# Patient Record
Sex: MALE | Race: WHITE | Employment: FULL TIME | URBAN - METROPOLITAN AREA
[De-identification: names, ages, dates, MRNs, and addresses within clinical notes are randomized per-mention and may not be internally consistent; named-entity substitution may affect disease eponyms.]

---

## 2023-05-22 ENCOUNTER — OFFICE VISIT (OUTPATIENT)
Dept: ORTHOPEDIC SURGERY | Age: 58
End: 2023-05-22
Payer: COMMERCIAL

## 2023-05-22 VITALS
WEIGHT: 265 LBS | OXYGEN SATURATION: 96 % | DIASTOLIC BLOOD PRESSURE: 114 MMHG | HEIGHT: 71 IN | BODY MASS INDEX: 37.1 KG/M2 | SYSTOLIC BLOOD PRESSURE: 169 MMHG | HEART RATE: 63 BPM

## 2023-05-22 DIAGNOSIS — S83.242A TEAR OF MEDIAL MENISCUS OF LEFT KNEE, UNSPECIFIED TEAR TYPE, UNSPECIFIED WHETHER OLD OR CURRENT TEAR, INITIAL ENCOUNTER: Primary | ICD-10-CM

## 2023-05-22 PROCEDURE — G8428 CUR MEDS NOT DOCUMENT: HCPCS | Performed by: ORTHOPAEDIC SURGERY

## 2023-05-22 PROCEDURE — 4004F PT TOBACCO SCREEN RCVD TLK: CPT | Performed by: ORTHOPAEDIC SURGERY

## 2023-05-22 PROCEDURE — 99203 OFFICE O/P NEW LOW 30 MIN: CPT | Performed by: ORTHOPAEDIC SURGERY

## 2023-05-22 PROCEDURE — 3017F COLORECTAL CA SCREEN DOC REV: CPT | Performed by: ORTHOPAEDIC SURGERY

## 2023-05-22 PROCEDURE — G8417 CALC BMI ABV UP PARAM F/U: HCPCS | Performed by: ORTHOPAEDIC SURGERY

## 2023-05-22 RX ORDER — IBUPROFEN 800 MG/1
800 TABLET ORAL EVERY 6 HOURS PRN
COMMUNITY

## 2023-05-22 RX ORDER — INDOMETHACIN 25 MG/1
25 CAPSULE ORAL 2 TIMES DAILY WITH MEALS
COMMUNITY

## 2023-05-22 NOTE — PROGRESS NOTES
New patient seen in office today for LT knee pain. Was seen in [facility] urgent care on 5/6/23 for LT knee pain. Stated most pain is on medial and posterior sides of joint. Stated he's unable to do steps, pain increases. Noticed increased pain and swelling about a month ago. ED gave pt pain meds and steroids with no alleviation. Stated pain is aching and occasionally sharp. Observed slight limping with ambulation into office. Experiencing limited ROM and no hx of cortisone injections.

## 2023-05-22 NOTE — PATIENT INSTRUCTIONS
Continue weight-bearing as tolerated. Continue range of motion exercises as instructed. Ice and elevate as needed. Tylenol or Motrin for pain. Follow up in after MRI  Central scheduling [phone] will be calling you to schedule your MRI. If you do not hear from them with in a week give them a call. We are committed to providing you the best care possible. If you receive a survey after visiting one of our offices, please take time to share your experience concerning your physician office visit. These surveys are confidential and no health information about you is shared. We are eager to improve for you and we are counting on your feedback to help make that happen.

## 2023-05-23 ASSESSMENT — ENCOUNTER SYMPTOMS
CHEST TIGHTNESS: 0
ABDOMINAL PAIN: 0
BACK PAIN: 0
EYE REDNESS: 0
COLOR CHANGE: 0

## 2023-05-23 NOTE — PROGRESS NOTES
Subjective:      Patient ID: Lavonia Fleischer is a 62 y.o. male. New patient seen in office today for LT knee pain. Was seen in [facility] urgent care on 5/6/23 for LT knee pain. Stated most pain is on medial and posterior sides of joint. Stated he's unable to do steps, pain increases. Noticed increased pain and swelling about a month ago. ED gave pt pain meds and steroids with no alleviation. Stated pain is aching and occasionally sharp. Observed slight limping with ambulation into office. Experiencing limited ROM and no hx of cortisone injections. He comes in today for his first visit with me in regards to his left knee pain. He states that since he twisted his knee about a month ago he is continued having significant swelling globally in the left knee as well as persistent sharp, stabbing and catching pain along the medial aspect of his left knee. Patient denies any new injury to the involved extremity/ joint, denies numbness or tingling in the involved extremity and denies fever or chills. He is a  and is unable to perform his work duties safely at this point      Review of Systems   Constitutional:  Negative for activity change, chills and fever. HENT:  Negative for congestion and drooling. Eyes:  Negative for redness. Respiratory:  Negative for chest tightness. Cardiovascular:  Negative for chest pain. Gastrointestinal:  Negative for abdominal pain. Endocrine: Negative for cold intolerance and heat intolerance. Musculoskeletal:  Positive for arthralgias, gait problem, joint swelling and myalgias. Negative for back pain. Skin:  Negative for color change, pallor, rash and wound. Neurological:  Negative for weakness and numbness. Psychiatric/Behavioral:  Negative for confusion. No past medical history on file. Objective:   Physical Exam  Constitutional:       Appearance: He is well-developed. HENT:      Head: Normocephalic. Nose: No congestion.    Eyes:

## 2023-05-26 ENCOUNTER — HOSPITAL ENCOUNTER (OUTPATIENT)
Dept: MRI IMAGING | Age: 58
Discharge: HOME | End: 2023-05-26
Payer: COMMERCIAL

## 2023-05-26 DIAGNOSIS — S83.242A TEAR OF MEDIAL MENISCUS OF LEFT KNEE, UNSPECIFIED TEAR TYPE, UNSPECIFIED WHETHER OLD OR CURRENT TEAR, INITIAL ENCOUNTER: ICD-10-CM

## 2023-05-26 PROCEDURE — 73721 MRI JNT OF LWR EXTRE W/O DYE: CPT

## 2023-05-31 ENCOUNTER — OFFICE VISIT (OUTPATIENT)
Dept: ORTHOPEDIC SURGERY | Age: 58
End: 2023-05-31
Payer: COMMERCIAL

## 2023-05-31 VITALS — BODY MASS INDEX: 37.1 KG/M2 | HEIGHT: 71 IN | WEIGHT: 265 LBS | RESPIRATION RATE: 15 BRPM

## 2023-05-31 DIAGNOSIS — M23.204 OLD TEAR OF MEDIAL MENISCUS OF LEFT KNEE, UNSPECIFIED TEAR TYPE: Primary | ICD-10-CM

## 2023-05-31 PROCEDURE — 99213 OFFICE O/P EST LOW 20 MIN: CPT | Performed by: ORTHOPAEDIC SURGERY

## 2023-05-31 PROCEDURE — 3017F COLORECTAL CA SCREEN DOC REV: CPT | Performed by: ORTHOPAEDIC SURGERY

## 2023-05-31 PROCEDURE — 4004F PT TOBACCO SCREEN RCVD TLK: CPT | Performed by: ORTHOPAEDIC SURGERY

## 2023-05-31 PROCEDURE — G8427 DOCREV CUR MEDS BY ELIG CLIN: HCPCS | Performed by: ORTHOPAEDIC SURGERY

## 2023-05-31 PROCEDURE — G8417 CALC BMI ABV UP PARAM F/U: HCPCS | Performed by: ORTHOPAEDIC SURGERY

## 2023-05-31 RX ORDER — LOSARTAN POTASSIUM AND HYDROCHLOROTHIAZIDE 25; 100 MG/1; MG/1
TABLET ORAL
COMMUNITY
Start: 2023-05-30

## 2023-05-31 RX ORDER — SILDENAFIL 100 MG/1
100 TABLET, FILM COATED ORAL DAILY PRN
COMMUNITY
Start: 2022-06-13 | End: 2023-06-13

## 2023-05-31 ASSESSMENT — ENCOUNTER SYMPTOMS
ABDOMINAL PAIN: 0
CHEST TIGHTNESS: 0
COLOR CHANGE: 0
EYE REDNESS: 0
BACK PAIN: 0

## 2023-05-31 NOTE — PROGRESS NOTES
Patient returns to the office today for FU of the left knee. Pt states pain today is a 7/10. Pt states going up and down the stairs and getting up from a sitting position will cause increase pain. Pt states he does take ibuprofen for the pain. MRI of the left knee completed on 5/26/23  IMPRESSION:  1. Complex tear of the posterior horn the medial meniscus. There is a small  fragment at the femoral surface. 2. Contusion at the far medial aspect of the medial femoral condyle. 3. Small osteochondral defect of the lateral aspect of the medial femoral  condyle. There are several cartilage defects of the medial femoral condyle. 4. Moderate amount of fluid in the posterior bursa.

## 2023-05-31 NOTE — PROGRESS NOTES
Subjective:      Patient ID: Gaston Quintero is a 62 y.o. male. Patient returns to the office today for FU of the left knee. Pt states pain today is a 7/10. Pt states going up and down the stairs and getting up from a sitting position will cause increase pain. Pt states he does take ibuprofen for the pain. MRI of the left knee completed on 5/26/23  IMPRESSION:  1. Complex tear of the posterior horn the medial meniscus. There is a small  fragment at the femoral surface. 2. Contusion at the far medial aspect of the medial femoral condyle. 3. Small osteochondral defect of the lateral aspect of the medial femoral  condyle. There are several cartilage defects of the medial femoral condyle. 4. Moderate amount of fluid in the posterior bursa. He comes in today for a recheck of his left knee pain and follow-up after MRI of the left knee. He states that since he twisted his knee about a month ago he is continued having significant swelling globally in the left knee as well as persistent sharp, stabbing and catching pain along the medial aspect of his left knee. Patient denies any new injury to the involved extremity/ joint, denies numbness or tingling in the involved extremity and denies fever or chills. He is a  and is unable to perform his work duties safely at this point      Review of Systems   Constitutional:  Negative for activity change, chills and fever. HENT:  Negative for congestion and drooling. Eyes:  Negative for redness. Respiratory:  Negative for chest tightness. Cardiovascular:  Negative for chest pain. Gastrointestinal:  Negative for abdominal pain. Endocrine: Negative for cold intolerance and heat intolerance. Musculoskeletal:  Positive for arthralgias, gait problem, joint swelling and myalgias. Negative for back pain. Skin:  Negative for color change, pallor, rash and wound. Neurological:  Negative for weakness and numbness.

## 2023-05-31 NOTE — PATIENT INSTRUCTIONS
We will schedule surgery at soonest convenience.  If you have any questions regarding your surgery please call our office and ask to speak with Hill Venegas [phone]

## 2023-06-01 ENCOUNTER — TELEPHONE (OUTPATIENT)
Dept: ORTHOPEDIC SURGERY | Age: 58
End: 2023-06-01

## 2023-06-01 NOTE — TELEPHONE ENCOUNTER
Patient scheduled for    Left Knee Arthroscopy with Partial Medial Meniscectomy  Date: 6/15/23  Facility: [facility]  Surgeon: Armando Bahena D.O    Surgery Request faxed 6/1/23  PCP Clearance faxed to Dr. Mellissa Thompson 6/1/23    Pre Op Appt 5/31/23    [address]  Contacted 6/1/23 via online look up tool  Status: No Prior Auth Required for CPT 02492 ICD C03.621G for outpatient    Phone PAT

## 2023-06-12 RX ORDER — METOPROLOL SUCCINATE 50 MG/1
50 TABLET, EXTENDED RELEASE ORAL DAILY
COMMUNITY

## 2023-06-12 RX ORDER — AMLODIPINE BESYLATE 10 MG/1
10 TABLET ORAL DAILY
COMMUNITY

## 2023-06-12 NOTE — PROGRESS NOTES
.Surgery @ [facility] on 6/15/23 you will be called 6/14/23 with times    NOTHING TO EAT OR DRINK AFTER MIDNIGHT DAY OF SURGERY    1. Enter thru the hospital main entrance on day of surgery, check in at the Information Desk. If you arrive prior to 6:00am, enter thru the ER entrance. 2. Follow the directions as prescribed by the doctor for your procedure and medications. Morning of surgery take: NPO          Stop vitamins, supplements and NSAIDS: today     3. Check with your Doctor regarding stopping blood thinners and follow their instructions. 4. Do not smoke, vape or use chewing tobacco morning of surgery. Do not drink any alcoholic beverages 24 hours prior to surgery. This includes NA Beer. No street drugs 7 days prior to surgery. 5. If you have dentures, contacts of glasses they will be removed before going to the OR; please bring a case. 6. Please bring picture ID, insurance card, paperwork from the doctors office (H & P, Consent, & card for implantable devices).

## 2023-06-12 NOTE — PROGRESS NOTES
Patient states his normal medication list includes Amlodipine and Metoprolol however he has been out of scripts for approx a month, his appointment to renew scripts is 6/19/23, Anesthesiology notified

## 2023-06-14 NOTE — PROGRESS NOTES
Left VM with callback number notifying patient of surgery time at [facility] 6/15/2023 1130 arrival at 0830

## 2023-06-15 ENCOUNTER — HOSPITAL ENCOUNTER (OUTPATIENT)
Age: 58
Setting detail: OUTPATIENT SURGERY
Discharge: HOME | End: 2023-06-15
Attending: ORTHOPAEDIC SURGERY | Admitting: ORTHOPAEDIC SURGERY
Payer: COMMERCIAL

## 2023-06-15 VITALS
DIASTOLIC BLOOD PRESSURE: 96 MMHG | TEMPERATURE: 97 F | BODY MASS INDEX: 37.1 KG/M2 | SYSTOLIC BLOOD PRESSURE: 155 MMHG | HEART RATE: 53 BPM | RESPIRATION RATE: 18 BRPM | WEIGHT: 265 LBS | OXYGEN SATURATION: 95 % | HEIGHT: 71 IN

## 2023-06-15 DIAGNOSIS — Z98.890 S/P LEFT KNEE ARTHROSCOPY: Primary | ICD-10-CM

## 2023-06-15 PROCEDURE — 3700000000 HC ANESTHESIA ATTENDED CARE: Performed by: ORTHOPAEDIC SURGERY

## 2023-06-15 PROCEDURE — 6370000000 HC RX 637 (ALT 250 FOR IP): Performed by: ORTHOPAEDIC SURGERY

## 2023-06-15 PROCEDURE — 3600000014 HC SURGERY LEVEL 4 ADDTL 15MIN: Performed by: ORTHOPAEDIC SURGERY

## 2023-06-15 PROCEDURE — A4217 STERILE WATER/SALINE, 500 ML: HCPCS | Performed by: ORTHOPAEDIC SURGERY

## 2023-06-15 PROCEDURE — 7100000010 HC PHASE II RECOVERY - FIRST 15 MIN: Performed by: ORTHOPAEDIC SURGERY

## 2023-06-15 PROCEDURE — 7100000001 HC PACU RECOVERY - ADDTL 15 MIN: Performed by: ORTHOPAEDIC SURGERY

## 2023-06-15 PROCEDURE — 6360000002 HC RX W HCPCS: Performed by: ORTHOPAEDIC SURGERY

## 2023-06-15 PROCEDURE — 2580000003 HC RX 258: Performed by: ORTHOPAEDIC SURGERY

## 2023-06-15 PROCEDURE — 29880 ARTHRS KNE SRG MNISECTMY M&L: CPT | Performed by: ORTHOPAEDIC SURGERY

## 2023-06-15 PROCEDURE — 6360000002 HC RX W HCPCS: Performed by: ANESTHESIOLOGY

## 2023-06-15 PROCEDURE — 3600000004 HC SURGERY LEVEL 4 BASE: Performed by: ORTHOPAEDIC SURGERY

## 2023-06-15 PROCEDURE — 2720000010 HC SURG SUPPLY STERILE: Performed by: ORTHOPAEDIC SURGERY

## 2023-06-15 PROCEDURE — 7100000011 HC PHASE II RECOVERY - ADDTL 15 MIN: Performed by: ORTHOPAEDIC SURGERY

## 2023-06-15 PROCEDURE — 7100000000 HC PACU RECOVERY - FIRST 15 MIN: Performed by: ORTHOPAEDIC SURGERY

## 2023-06-15 PROCEDURE — 2709999900 HC NON-CHARGEABLE SUPPLY: Performed by: ORTHOPAEDIC SURGERY

## 2023-06-15 PROCEDURE — 3700000001 HC ADD 15 MINUTES (ANESTHESIA): Performed by: ORTHOPAEDIC SURGERY

## 2023-06-15 RX ORDER — KETOROLAC TROMETHAMINE 15 MG/ML
30 INJECTION, SOLUTION INTRAMUSCULAR; INTRAVENOUS EVERY 6 HOURS
Status: DISCONTINUED | OUTPATIENT
Start: 2023-06-15 | End: 2023-06-15 | Stop reason: HOSPADM

## 2023-06-15 RX ORDER — CEPHALEXIN 250 MG/1
250 CAPSULE ORAL 4 TIMES DAILY
Qty: 4 CAPSULE | Refills: 0 | Status: SHIPPED | OUTPATIENT
Start: 2023-06-15 | End: 2023-06-16

## 2023-06-15 RX ORDER — ONDANSETRON 2 MG/ML
4 INJECTION INTRAMUSCULAR; INTRAVENOUS
Status: DISCONTINUED | OUTPATIENT
Start: 2023-06-15 | End: 2023-06-15 | Stop reason: HOSPADM

## 2023-06-15 RX ORDER — SODIUM CHLORIDE 9 MG/ML
INJECTION, SOLUTION INTRAVENOUS PRN
Status: CANCELLED | OUTPATIENT
Start: 2023-06-15

## 2023-06-15 RX ORDER — SODIUM CHLORIDE 0.9 % (FLUSH) 0.9 %
5-40 SYRINGE (ML) INJECTION EVERY 12 HOURS SCHEDULED
Status: DISCONTINUED | OUTPATIENT
Start: 2023-06-15 | End: 2023-06-15 | Stop reason: HOSPADM

## 2023-06-15 RX ORDER — ROPIVACAINE HYDROCHLORIDE 5 MG/ML
INJECTION, SOLUTION EPIDURAL; INFILTRATION; PERINEURAL
Status: COMPLETED | OUTPATIENT
Start: 2023-06-15 | End: 2023-06-15

## 2023-06-15 RX ORDER — IPRATROPIUM BROMIDE AND ALBUTEROL SULFATE 2.5; .5 MG/3ML; MG/3ML
1 SOLUTION RESPIRATORY (INHALATION)
Status: DISCONTINUED | OUTPATIENT
Start: 2023-06-15 | End: 2023-06-15 | Stop reason: HOSPADM

## 2023-06-15 RX ORDER — SODIUM CHLORIDE, SODIUM LACTATE, POTASSIUM CHLORIDE, CALCIUM CHLORIDE 600; 310; 30; 20 MG/100ML; MG/100ML; MG/100ML; MG/100ML
INJECTION, SOLUTION INTRAVENOUS CONTINUOUS
Status: DISCONTINUED | OUTPATIENT
Start: 2023-06-15 | End: 2023-06-15 | Stop reason: HOSPADM

## 2023-06-15 RX ORDER — MEPERIDINE HYDROCHLORIDE 25 MG/ML
12.5 INJECTION INTRAMUSCULAR; INTRAVENOUS; SUBCUTANEOUS EVERY 5 MIN PRN
Status: DISCONTINUED | OUTPATIENT
Start: 2023-06-15 | End: 2023-06-15 | Stop reason: HOSPADM

## 2023-06-15 RX ORDER — OXYCODONE HYDROCHLORIDE 5 MG/1
5 TABLET ORAL EVERY 4 HOURS PRN
Status: CANCELLED | OUTPATIENT
Start: 2023-06-15

## 2023-06-15 RX ORDER — ACETAMINOPHEN 500 MG
1000 TABLET ORAL ONCE
Status: COMPLETED | OUTPATIENT
Start: 2023-06-15 | End: 2023-06-15

## 2023-06-15 RX ORDER — HYDROCODONE BITARTRATE AND ACETAMINOPHEN 5; 325 MG/1; MG/1
1 TABLET ORAL EVERY 6 HOURS PRN
Qty: 18 TABLET | Refills: 0 | Status: SHIPPED | OUTPATIENT
Start: 2023-06-15 | End: 2023-06-22

## 2023-06-15 RX ORDER — OXYCODONE HYDROCHLORIDE 5 MG/1
5 TABLET ORAL
Status: DISCONTINUED | OUTPATIENT
Start: 2023-06-15 | End: 2023-06-15 | Stop reason: HOSPADM

## 2023-06-15 RX ORDER — MAGNESIUM HYDROXIDE 1200 MG/15ML
LIQUID ORAL CONTINUOUS PRN
Status: COMPLETED | OUTPATIENT
Start: 2023-06-15 | End: 2023-06-15

## 2023-06-15 RX ORDER — SODIUM CHLORIDE 9 MG/ML
INJECTION, SOLUTION INTRAVENOUS PRN
Status: DISCONTINUED | OUTPATIENT
Start: 2023-06-15 | End: 2023-06-15 | Stop reason: HOSPADM

## 2023-06-15 RX ORDER — SODIUM CHLORIDE 0.9 % (FLUSH) 0.9 %
5-40 SYRINGE (ML) INJECTION PRN
Status: DISCONTINUED | OUTPATIENT
Start: 2023-06-15 | End: 2023-06-15 | Stop reason: HOSPADM

## 2023-06-15 RX ORDER — ONDANSETRON 4 MG/1
4 TABLET, ORALLY DISINTEGRATING ORAL EVERY 8 HOURS PRN
Status: CANCELLED | OUTPATIENT
Start: 2023-06-15

## 2023-06-15 RX ORDER — OXYCODONE HYDROCHLORIDE 5 MG/1
10 TABLET ORAL EVERY 4 HOURS PRN
Status: CANCELLED | OUTPATIENT
Start: 2023-06-15

## 2023-06-15 RX ORDER — OXYCODONE HYDROCHLORIDE 5 MG/1
10 TABLET ORAL PRN
Status: DISCONTINUED | OUTPATIENT
Start: 2023-06-15 | End: 2023-06-15 | Stop reason: HOSPADM

## 2023-06-15 RX ORDER — SODIUM CHLORIDE 0.9 % (FLUSH) 0.9 %
5-40 SYRINGE (ML) INJECTION PRN
Status: CANCELLED | OUTPATIENT
Start: 2023-06-15

## 2023-06-15 RX ORDER — SODIUM CHLORIDE 0.9 % (FLUSH) 0.9 %
5-40 SYRINGE (ML) INJECTION EVERY 12 HOURS SCHEDULED
Status: CANCELLED | OUTPATIENT
Start: 2023-06-15

## 2023-06-15 RX ORDER — ONDANSETRON 2 MG/ML
4 INJECTION INTRAMUSCULAR; INTRAVENOUS EVERY 6 HOURS PRN
Status: CANCELLED | OUTPATIENT
Start: 2023-06-15

## 2023-06-15 RX ORDER — PROCHLORPERAZINE EDISYLATE 5 MG/ML
5 INJECTION INTRAMUSCULAR; INTRAVENOUS
Status: DISCONTINUED | OUTPATIENT
Start: 2023-06-15 | End: 2023-06-15 | Stop reason: HOSPADM

## 2023-06-15 RX ADMIN — ACETAMINOPHEN 1000 MG: 500 TABLET ORAL at 08:59

## 2023-06-15 RX ADMIN — HYDROMORPHONE HYDROCHLORIDE 0.5 MG: 1 INJECTION, SOLUTION INTRAMUSCULAR; INTRAVENOUS; SUBCUTANEOUS at 12:02

## 2023-06-15 RX ADMIN — HYDROMORPHONE HYDROCHLORIDE 0.5 MG: 1 INJECTION, SOLUTION INTRAMUSCULAR; INTRAVENOUS; SUBCUTANEOUS at 12:23

## 2023-06-15 RX ADMIN — SODIUM CHLORIDE, POTASSIUM CHLORIDE, SODIUM LACTATE AND CALCIUM CHLORIDE: 600; 310; 30; 20 INJECTION, SOLUTION INTRAVENOUS at 08:56

## 2023-06-15 RX ADMIN — HYDROMORPHONE HYDROCHLORIDE 0.5 MG: 1 INJECTION, SOLUTION INTRAMUSCULAR; INTRAVENOUS; SUBCUTANEOUS at 12:11

## 2023-06-15 RX ADMIN — CEFAZOLIN 3000 MG: 10 INJECTION, POWDER, FOR SOLUTION INTRAVENOUS at 10:02

## 2023-06-15 ASSESSMENT — PAIN DESCRIPTION - LOCATION
LOCATION: KNEE

## 2023-06-15 ASSESSMENT — PAIN DESCRIPTION - FREQUENCY
FREQUENCY: CONTINUOUS

## 2023-06-15 ASSESSMENT — PAIN DESCRIPTION - PAIN TYPE
TYPE: SURGICAL PAIN

## 2023-06-15 ASSESSMENT — PAIN DESCRIPTION - ONSET
ONSET: ON-GOING

## 2023-06-15 ASSESSMENT — PAIN DESCRIPTION - DESCRIPTORS
DESCRIPTORS: BURNING;DISCOMFORT
DESCRIPTORS: BURNING

## 2023-06-15 ASSESSMENT — PAIN - FUNCTIONAL ASSESSMENT
PAIN_FUNCTIONAL_ASSESSMENT: PREVENTS OR INTERFERES SOME ACTIVE ACTIVITIES AND ADLS

## 2023-06-15 ASSESSMENT — PAIN SCALES - GENERAL
PAINLEVEL_OUTOF10: 8
PAINLEVEL_OUTOF10: 0
PAINLEVEL_OUTOF10: 0
PAINLEVEL_OUTOF10: 7
PAINLEVEL_OUTOF10: 5
PAINLEVEL_OUTOF10: 8

## 2023-06-15 ASSESSMENT — PAIN DESCRIPTION - ORIENTATION
ORIENTATION: LEFT

## 2023-06-15 NOTE — H&P
Swelling and effusion present. No deformity, erythema, ecchymosis, lacerations or bony tenderness. Normal range of motion. Tenderness present over the medial joint line. No lateral joint line, MCL, LCL or patellar tendon tenderness. No LCL laxity or MCL laxity. Normal alignment and normal patellar mobility. Instability Tests: Medial aPntera test positive. Skin:     General: Skin is warm and dry. Capillary Refill: Capillary refill takes less than 2 seconds. Coloration: Skin is not pale. Findings: No erythema or rash. Neurological:      Mental Status: He is alert and oriented to person, place, and time. Sensory: No sensory deficit. Motor: No weakness. Left knee-Skin intact with no erythema, ecchymosis or lacerations present. 0-120  Moderate knee effusion     XR KNEE LEFT (3 VIEWS)     Result Date: 5/22/2023  XRAY X-ray 3 views of the left knee reviewed by me today in the office demonstrates age appropriate bone density throughout with moderate degenerative changes with approximately 75% medial patellofemoral joint space narrowing, normal tracking of the patella, no acute osseous abnormalities. Impression: Moderate degenerative changes of the left knee as above with no acute process. BP (!) 152/96   Pulse 66   Temp 98.2 °F (36.8 °C) (Temporal)   Resp 20   Ht 5' 11\" (1.803 m)   Wt 265 lb (120.2 kg)   SpO2 96%   BMI 36.96 kg/m²     Assessment:   Left knee OA, moderate  Left knee medial meniscus tear                Plan:   I discussed with him today his MRI  findings. I explained to him that he does have a large tear in his left knee medial meniscus. At this point given his persistent symptoms and with his MRI findings I recommend surgical treatment. I discussed with him today performing left knee arthroscopy with partial medial meniscectomy. I explained risks, benefits, possible complications of the procedure and answered all questions for the patient.     I

## 2023-06-15 NOTE — DISCHARGE INSTRUCTIONS
[facility]  [phone]    Do not drive, work around [address] [place], [place] [address] or use equipment. Do not drink any alcoholic beverages. Do not smoke while alone. Avoid making important decisions. Plan to spend a quiet, relaxed evening @ home. Resume normal activities as you begin to feel better. Eat lightly for your first meal, then gradually increase your diet to what is normal for you. In case of nausea, avoid food and drink only clear liquids. Resume food as nausea ceases. Notify your surgeon if you experience fever, chills, large amount of bleeding, difficulty breathing, persistent nausea and vomiting or any other disturbing problem. Call for a follow-up appointment with your surgeon.             Watch for signs of infection    Elevated temperature, redness or pain at site,   Drainage at incision,

## 2023-06-15 NOTE — PROGRESS NOTES
1325  [facility] Patient 5/10 tolerable pain. Beverage of choice offered to patient. Call light in reach and bed in lowest position. 1344 IV removed. Patient sitting on side of bed getting dressed assisted by Harrison Billy  [phone] Discharge instructions given to Harrison Billy. 6033 Patient escorted to car via wheelchair transported home by Harrison Billy.

## 2023-06-15 NOTE — PROGRESS NOTES
1142: Arrived to PACU from OR. Monitors applied, alarms on. Report obtained from [facility] and Cristopher Alan.  7269: Medicated for left knee pain. Turned and repositioned in bed, warm blankets on. FOB up, ice continues. Able to wiggle toes and states sensation is normal.  1211: Medicated for left knee pain. 1223: Medicated for left knee pain. 1242: Transported to [facility]. Wife at bedside.

## 2023-06-15 NOTE — OP NOTE
DATE OF PROCEDURE:  6/15/2023    PREOPERATIVE DIAGNOSIS:   1. Left knee medial meniscus tear. 2.  Left knee DJD     POSTOPERATIVE DIAGNOSES:  1. Left knee medial and lateral meniscus tears. 2.  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 PROCEDURES:  1. Diagnostic and operative arthroscopy of left knee with partial medial and lateral   meniscectomy. 2.  Left knee arthroscopy with chondroplasty of the patella, femoral trochlea medial femoral condyle. ATTENDING SURGEON:  Dali Partida DO    ANESTHESIA:  General.    ESTIMATED BLOOD LOSS:  5 mL. TOTAL TOURNIQUET TIME: 27 minutes. FLUIDS:  600 mL of crystalloids. INDICATIONS FOR PROCEDURE:  The patient is a 59-year-old male with a long history of pain in the left knee after sustaining a twisting injury to the left knee. For this he underwent conservative treatment with no relief of his symptoms. MRI was subsequently obtained. Given  his persistent symptoms despite conservative treatment and with his MRI  findings, I recommended surgical treatment. I explained the risks,  benefits and possible complications of the procedure to the patient and  after answering all of his questions, he consented to undergo the above  procedure. REPORT OF PROCEDURE:  The patient was seen and evaluated in the  preoperative holding area where the left lower extremity was signed in his  presence. At this point, care of the patient was turned over to anesthesia  team, was transported back to the operative suite. He was placed supine on  the operating table with the left lower extremity in leg benson. General  anesthesia was applied and once adequate anesthesia was obtained, the left  lower extremity was prepped and draped in usual sterile fashion. Preoperative antibiotics were administered.   At this point, a time-out was  performed and all in attendance were in

## 2023-06-15 NOTE — BRIEF OP NOTE
Brief Postoperative Note      Patient: Jase Combs  YOB: 1965  MRN: [id number]    Date of Procedure: 6/15/2023    Pre-Op Diagnosis Codes:     * Oth tear of medial meniscus, current injury, left knee, init [S83.242A]    Post-Op Diagnosis: Same       Procedure(s):  LEFT KNEE ARTHROSCOPY WITH PARTIAL MEDIAL MENISCECTOMY    Surgeon(s):  Rikki Kim DO    Assistant:  * No surgical staff found *    Anesthesia: General    Estimated Blood Loss (mL): Minimal    Complications: None    Specimens:   * No specimens in log *    Implants:  * No implants in log *      Drains: * No LDAs found *    Findings: L MMT, LMT      Electronically signed by Cornelia Phillips DO on 6/15/2023 at 11:46 AM

## 2023-06-15 NOTE — BRIEF OP NOTE
Brief Postoperative Note      Patient: Thom Mcfadden  YOB: 1965  MRN: [id number]    Date of Procedure: 6/15/2023    Pre-Op Diagnosis Codes:     * Oth tear of medial meniscus, current injury, left knee, init [S83.242A]    Post-Op Diagnosis: Same       Procedure(s):  LEFT KNEE ARTHROSCOPY WITH PARTIAL MEDIAL MENISCECTOMY    Surgeon(s):  Nimco Mckenzie DO    Assistant:  * No surgical staff found *    Anesthesia: General    Estimated Blood Loss (mL): Minimal    Complications: None    Specimens:   * No specimens in log *    Implants:  * No implants in log *      Drains: * No LDAs found *    Findings: L MMT, LMT      Electronically signed by Mala Spencer DO on 6/15/2023 at 11:20 AM

## 2023-06-28 ENCOUNTER — OFFICE VISIT (OUTPATIENT)
Dept: ORTHOPEDIC SURGERY | Age: 58
End: 2023-06-28

## 2023-06-28 VITALS — OXYGEN SATURATION: 96 % | BODY MASS INDEX: 36.96 KG/M2 | HEIGHT: 71 IN | TEMPERATURE: 98.2 F | HEART RATE: 64 BPM

## 2023-06-28 DIAGNOSIS — Z98.890 S/P LEFT KNEE ARTHROSCOPY: Primary | ICD-10-CM

## 2023-06-28 PROCEDURE — 99024 POSTOP FOLLOW-UP VISIT: CPT | Performed by: ORTHOPAEDIC SURGERY

## 2023-06-29 ASSESSMENT — ENCOUNTER SYMPTOMS
COLOR CHANGE: 0
ABDOMINAL PAIN: 0
BACK PAIN: 0
EYE REDNESS: 0
CHEST TIGHTNESS: 0

## 2023-07-27 ENCOUNTER — OFFICE VISIT (OUTPATIENT)
Dept: ORTHOPEDIC SURGERY | Age: 58
End: 2023-07-27

## 2023-07-27 VITALS
RESPIRATION RATE: 14 BRPM | BODY MASS INDEX: 36.82 KG/M2 | HEIGHT: 71 IN | OXYGEN SATURATION: 96 % | WEIGHT: 263 LBS | HEART RATE: 70 BPM

## 2023-07-27 DIAGNOSIS — Z98.890 S/P LEFT KNEE ARTHROSCOPY: Primary | ICD-10-CM

## 2023-07-27 DIAGNOSIS — M17.12 ARTHRITIS OF KNEE, LEFT: ICD-10-CM

## 2023-07-27 RX ORDER — TRIAMCINOLONE ACETONIDE 40 MG/ML
40 INJECTION, SUSPENSION INTRA-ARTICULAR; INTRAMUSCULAR ONCE
Status: SHIPPED | OUTPATIENT
Start: 2023-07-27

## 2023-07-27 NOTE — PATIENT INSTRUCTIONS
Injection given into the left knee  No high impact activities for the left knee for a least 24-48  Continue weight bear as tolerated  Continue range of motion and exercises as instruction  Ice and elevate as needed  Tylenol or Motrin for pain  Call office in 2-3 weeks if the knee is no longer better and we will submit for gel injection into the left knee    We are committed to providing you the best care possible. If you receive a survey after visiting one of our offices, please take time to share your experience concerning your physician office visit. These surveys are confidential and no health information about you is shared. We are eager to improve for you and we are counting on your feedback to help make that happen.

## 2023-07-27 NOTE — PROGRESS NOTES
Date of surgery:   6/15/2023  Surgeon: Dr. Alice Rausch    History:  Mr. Garry Mccullough is here in follow up regarding his left knee arthroscopy with chondroplasty and meniscectomy. He states that he is improved to some degree but after he went back to work full duty he has noticed increased pain in the knee mostly along the medial side of the knee. He really feels like it is just inflamed at this time and is asking for an injection to help get him through this short period of time until he can strengthen the leg. He does not want to discuss the viscosupplementation injections yet. He rates his pain today at a 6/10. Physical:  Vitals:    07/27/23 1515   Pulse: 70   Resp: 14   SpO2: 96%     Left knee arthroscopic portal sites are well-healed. No erythema around the knee. Minimal knee joint effusion noted. Range of motion of the left knee shows 0 to 110 degrees. Arthroscopic pictures were reviewed prior to seeing the patient and those pictures did show moderate degenerative changes throughout the knee with grade 3 changes in multiple areas of the knee as well as some grade 2 changes of the knee. Impression: Status post above, doing well       Plan:   I discussed options regarding the knee moving forward. I did discuss viscosupplementation injections with the patient. He asked today if it would be possible just to get a steroid injection just to get some relief from the pain and inflammation. It has been 6 weeks since his surgery so I felt that there is no harm in trying a steroid injection at this point but if he continued to have pain after the steroid injection he should call the office and we will go ahead and get the viscosupplementation injections approved. Left knee injection procedure note    Pre-procedure diagnosis:  left knee DJD     Post-procedure diagnosis:  same    Procedure: The planned procedure/risks/benefits/alternatives were discussed with the patient.   Risks include, but are not

## 2023-11-29 ENCOUNTER — TELEPHONE (OUTPATIENT)
Dept: ORTHOPEDIC SURGERY | Age: 58
End: 2023-11-29

## 2023-11-29 ENCOUNTER — OFFICE VISIT (OUTPATIENT)
Dept: ORTHOPEDIC SURGERY | Age: 58
End: 2023-11-29
Payer: COMMERCIAL

## 2023-11-29 VITALS
HEIGHT: 71 IN | RESPIRATION RATE: 17 BRPM | HEART RATE: 67 BPM | WEIGHT: 252 LBS | OXYGEN SATURATION: 96 % | BODY MASS INDEX: 35.28 KG/M2

## 2023-11-29 DIAGNOSIS — M17.12 ARTHRITIS OF KNEE, LEFT: Primary | ICD-10-CM

## 2023-11-29 PROCEDURE — G8427 DOCREV CUR MEDS BY ELIG CLIN: HCPCS | Performed by: ORTHOPAEDIC SURGERY

## 2023-11-29 PROCEDURE — 3017F COLORECTAL CA SCREEN DOC REV: CPT | Performed by: ORTHOPAEDIC SURGERY

## 2023-11-29 PROCEDURE — G8484 FLU IMMUNIZE NO ADMIN: HCPCS | Performed by: ORTHOPAEDIC SURGERY

## 2023-11-29 PROCEDURE — 1036F TOBACCO NON-USER: CPT | Performed by: ORTHOPAEDIC SURGERY

## 2023-11-29 PROCEDURE — 99214 OFFICE O/P EST MOD 30 MIN: CPT | Performed by: ORTHOPAEDIC SURGERY

## 2023-11-29 PROCEDURE — G8417 CALC BMI ABV UP PARAM F/U: HCPCS | Performed by: ORTHOPAEDIC SURGERY

## 2023-11-29 RX ORDER — LOSARTAN POTASSIUM 100 MG/1
TABLET ORAL
COMMUNITY
Start: 2023-10-18

## 2023-11-29 ASSESSMENT — ENCOUNTER SYMPTOMS
COLOR CHANGE: 0
ABDOMINAL PAIN: 0
EYE REDNESS: 0
CHEST TIGHTNESS: 0
BACK PAIN: 0

## 2023-11-29 NOTE — PATIENT INSTRUCTIONS
Gel injections ordered today. Once gel injections are approved we will be calling to get you scheduled. Reduce to 8 hour days at work.

## 2023-11-29 NOTE — PROGRESS NOTES
Patient returns to the office with left knee pain. Pt has had sx on 6/15/23 on this knee and was doing well until going to double shifts at his job. Pt stated the pain is different than the past and is on the medial side of the knee. Pt has been wearing an immobilizer brace to help stabilize the knee. Pt stated his pain today is about a 5/10 but increases with weightbearing activities.
Eyes:      Pupils: Pupils are equal, round, and reactive to light. Pulmonary:      Effort: Pulmonary effort is normal.   Musculoskeletal:         General: Swelling and tenderness present. No deformity. Cervical back: Normal range of motion. Right hip: Normal.      Left hip: Normal.      Right knee: No swelling, deformity, effusion, erythema, ecchymosis, lacerations or bony tenderness. Normal range of motion. No tenderness. No medial joint line, lateral joint line, MCL, LCL or patellar tendon tenderness. No LCL laxity or MCL laxity. Normal alignment and normal patellar mobility. Left knee: Swelling, effusion and bony tenderness present. No deformity, erythema, ecchymosis or lacerations. Normal range of motion. Tenderness present over the medial joint line. No lateral joint line, MCL, LCL or patellar tendon tenderness. No LCL laxity or MCL laxity. Normal alignment and normal patellar mobility. Instability Tests: Medial Pantera test negative. Skin:     General: Skin is warm and dry. Capillary Refill: Capillary refill takes less than 2 seconds. Coloration: Skin is not pale. Findings: No erythema or rash. Neurological:      Mental Status: He is alert and oriented to person, place, and time. Sensory: No sensory deficit. Motor: No weakness. Left knee-Skin intact with no erythema, ecchymosis or lacerations present. 0-130        Assessment:      Left knee OA, moderate to severe  Left knee arthroscopy, 5 months  Left knee OA with grade II chondromalacia of the patellofemoral compartment and grade II-III chondromalacia of the medial femoral condyle. Plan:      I discussed with him today that at this point most of the pain is coming from worsening arthritis in the left knee. I will reduce his hours down to 8 hours/day to see if this will help with his pain. I also recommend that we proceed with Orthovisc injections.   I did discuss the mechanics of the Orthovisc

## 2023-11-29 NOTE — TELEPHONE ENCOUNTER
Patient is requesting gel injections for the left knee. Pt sent FMLA paperwork for a reduction of 8 hours max per day at work.

## 2023-12-06 NOTE — TELEPHONE ENCOUNTER
I called [facility] and started a prior auth for the right knee for orthovisc     Omidscottjenniffer [place]le [phone]  [phone]  [address] [facility] faxed to [phone] for clinical review  Buy and bill in office procedure  Beth Gibson     Ref # [id number]

## 2023-12-07 NOTE — TELEPHONE ENCOUNTER
I relieved a call from [facility]. Euflexxa and Synvisc are a preferred products. Auth will be changed to the preferred drug Synvisc. msg relayed via voice mail

## 2023-12-13 ENCOUNTER — PROCEDURE VISIT (OUTPATIENT)
Dept: ORTHOPEDIC SURGERY | Age: 58
End: 2023-12-13
Payer: COMMERCIAL

## 2023-12-13 VITALS
BODY MASS INDEX: 35 KG/M2 | HEIGHT: 71 IN | HEART RATE: 73 BPM | RESPIRATION RATE: 16 BRPM | WEIGHT: 250 LBS | OXYGEN SATURATION: 95 %

## 2023-12-13 DIAGNOSIS — M17.12 ARTHRITIS OF KNEE, LEFT: Primary | ICD-10-CM

## 2023-12-13 PROCEDURE — 20610 DRAIN/INJ JOINT/BURSA W/O US: CPT | Performed by: PHYSICIAN ASSISTANT

## 2023-12-13 PROCEDURE — 99999 PR OFFICE/OUTPT VISIT,PROCEDURE ONLY: CPT | Performed by: PHYSICIAN ASSISTANT

## 2023-12-13 NOTE — PATIENT INSTRUCTIONS
Synvisc # 1  Rest both knees for 24-48 hours  Work on ROM and strengthening of knees and legs   May take NSAIDS or anti-inflammatories as needed  Weightbearing as tolerated  Follow up in one week for the 2nd injection    We are committed to providing you the best care possible. If you receive a survey after visiting one of our offices, please take time to share your experience concerning your physician office visit. These surveys are confidential and no health information about you is shared. We are eager to improve for you and we are counting on your feedback to help make that happen.

## 2023-12-27 ENCOUNTER — PROCEDURE VISIT (OUTPATIENT)
Dept: ORTHOPEDIC SURGERY | Age: 58
End: 2023-12-27

## 2023-12-27 VITALS
OXYGEN SATURATION: 97 % | RESPIRATION RATE: 14 BRPM | HEIGHT: 71 IN | BODY MASS INDEX: 35 KG/M2 | HEART RATE: 70 BPM | WEIGHT: 250 LBS

## 2023-12-27 DIAGNOSIS — M17.12 ARTHRITIS OF KNEE, LEFT: Primary | ICD-10-CM

## 2023-12-27 NOTE — PATIENT INSTRUCTIONS
Synvisc # 3  Rest both knees for 24-48 hours  Work on ROM and strengthening of knees and legs   May take NSAIDS or anti-inflammatories as needed  Weightbearing as tolerated  Follow up in 6 weeks    We are committed to providing you the best care possible. If you receive a survey after visiting one of our offices, please take time to share your experience concerning your physician office visit. These surveys are confidential and no health information about you is shared. We are eager to improve for you and we are counting on your feedback to help make that happen.

## 2024-02-06 NOTE — PROGRESS NOTES
Patient seen in office today for: Left knee FU, Synvisc injection given 12/27/2023    Patient reports /10 pain.  RICE and medication are effective to alleviate pain and reduce swelling.     Pain worsened by: Patient reports painful ROM & weight bearing.

## 2024-02-07 ENCOUNTER — OFFICE VISIT (OUTPATIENT)
Dept: ORTHOPEDIC SURGERY | Age: 59
End: 2024-02-07
Payer: COMMERCIAL

## 2024-02-07 VITALS — HEART RATE: 63 BPM | BODY MASS INDEX: 35 KG/M2 | OXYGEN SATURATION: 98 % | HEIGHT: 71 IN | WEIGHT: 250 LBS

## 2024-02-07 DIAGNOSIS — M17.12 ARTHRITIS OF KNEE, LEFT: Primary | ICD-10-CM

## 2024-02-07 PROCEDURE — 3017F COLORECTAL CA SCREEN DOC REV: CPT

## 2024-02-07 PROCEDURE — 99213 OFFICE O/P EST LOW 20 MIN: CPT

## 2024-02-07 PROCEDURE — 1036F TOBACCO NON-USER: CPT

## 2024-02-07 PROCEDURE — 20610 DRAIN/INJ JOINT/BURSA W/O US: CPT

## 2024-02-07 PROCEDURE — G8417 CALC BMI ABV UP PARAM F/U: HCPCS

## 2024-02-07 PROCEDURE — G8484 FLU IMMUNIZE NO ADMIN: HCPCS

## 2024-02-07 PROCEDURE — G8428 CUR MEDS NOT DOCUMENT: HCPCS

## 2024-02-07 RX ORDER — TRIAMCINOLONE ACETONIDE 40 MG/ML
80 INJECTION, SUSPENSION INTRA-ARTICULAR; INTRAMUSCULAR ONCE
Status: COMPLETED | OUTPATIENT
Start: 2024-02-07 | End: 2024-02-07

## 2024-02-07 RX ADMIN — TRIAMCINOLONE ACETONIDE 80 MG: 40 INJECTION, SUSPENSION INTRA-ARTICULAR; INTRAMUSCULAR at 15:27

## 2024-02-07 NOTE — PROGRESS NOTES
Patient returns to the office today for FU of the left knee gel injection given on 12/27/23. Pt states the gel injection did not help with his pain. Most of his pain is along the medial aspect of the knee. He wear a brace daily to help with his pain.

## 2024-02-07 NOTE — PATIENT INSTRUCTIONS
Continue weight-bearing as tolerated.  Continue range of motion exercises as instructed.  Ice and elevate as needed.  Tylenol or Motrin for pain.   Steroid injection given today in the left knee  Follow up in 6 weeks

## 2024-02-13 NOTE — PROGRESS NOTES
2/7/2024   Chief Complaint   Patient presents with    Follow-up    Knee Pain     Left knee        History of Present Illness:                             Olaf Vieyra is a 58 y.o. male returning to the office today for continued management of left knee pain.  Patient states he underwent gel injections in December and had a poor response to the injections.  He states his knee is still painful.  He would like a cortisone injection at today's visit.  He does wear a brace that helps with his pain and sense of stability.    Patient returns to the office today for FU of the left knee gel injection given on 12/27/23. Pt states the gel injection did not help with his pain. Most of his pain is along the medial aspect of the knee. He wear a brace daily to help with his pain.         Medical History  Patient's medications, allergies, past medical, surgical, social and family histories were reviewed and updated as appropriate.      Review of Systems   Constitutional:  Negative for fever.   HENT:  Negative for facial swelling and rhinorrhea.    Respiratory:  Negative for cough and shortness of breath.    Cardiovascular:  Negative for chest pain.   Gastrointestinal:  Negative for nausea.   Musculoskeletal:  Positive for arthralgias. Negative for back pain, gait problem, joint swelling, myalgias, neck pain and neck stiffness.   Skin:  Negative for pallor and rash.   Neurological:  Negative for facial asymmetry and speech difficulty.   Psychiatric/Behavioral:  Negative for agitation and confusion.                                                Examination:  General Exam:  Vitals: Pulse 63   Ht 1.803 m (5' 11\")   Wt 113.4 kg (250 lb)   SpO2 98%   BMI 34.87 kg/m²    Physical Exam  Constitutional:       General: He is not in acute distress.     Appearance: Normal appearance. He is not ill-appearing.   HENT:      Head: Normocephalic and atraumatic.      Nose: No rhinorrhea.   Eyes:      General: No scleral icterus.

## 2024-05-09 ENCOUNTER — OFFICE VISIT (OUTPATIENT)
Dept: ORTHOPEDIC SURGERY | Age: 59
End: 2024-05-09
Payer: COMMERCIAL

## 2024-05-09 VITALS
HEART RATE: 73 BPM | WEIGHT: 250 LBS | OXYGEN SATURATION: 97 % | HEIGHT: 71 IN | BODY MASS INDEX: 35 KG/M2 | RESPIRATION RATE: 14 BRPM

## 2024-05-09 DIAGNOSIS — M17.12 ARTHRITIS OF KNEE, LEFT: Primary | ICD-10-CM

## 2024-05-09 PROCEDURE — 1036F TOBACCO NON-USER: CPT | Performed by: PHYSICIAN ASSISTANT

## 2024-05-09 PROCEDURE — G8427 DOCREV CUR MEDS BY ELIG CLIN: HCPCS | Performed by: PHYSICIAN ASSISTANT

## 2024-05-09 PROCEDURE — G8417 CALC BMI ABV UP PARAM F/U: HCPCS | Performed by: PHYSICIAN ASSISTANT

## 2024-05-09 PROCEDURE — 99213 OFFICE O/P EST LOW 20 MIN: CPT | Performed by: PHYSICIAN ASSISTANT

## 2024-05-09 PROCEDURE — 20610 DRAIN/INJ JOINT/BURSA W/O US: CPT | Performed by: PHYSICIAN ASSISTANT

## 2024-05-09 PROCEDURE — 3017F COLORECTAL CA SCREEN DOC REV: CPT | Performed by: PHYSICIAN ASSISTANT

## 2024-05-09 RX ORDER — TRIAMCINOLONE ACETONIDE 40 MG/ML
40 INJECTION, SUSPENSION INTRA-ARTICULAR; INTRAMUSCULAR ONCE
Status: COMPLETED | OUTPATIENT
Start: 2024-05-09 | End: 2024-05-09

## 2024-05-09 RX ADMIN — TRIAMCINOLONE ACETONIDE 40 MG: 40 INJECTION, SUSPENSION INTRA-ARTICULAR; INTRAMUSCULAR at 15:47

## 2024-05-09 ASSESSMENT — ENCOUNTER SYMPTOMS
GASTROINTESTINAL NEGATIVE: 1
RESPIRATORY NEGATIVE: 1
EYES NEGATIVE: 1

## 2024-05-09 NOTE — PATIENT INSTRUCTIONS
Continue weight-bearing as tolerated.  Continue range of motion exercises as instructed.  Ice and elevate as needed.  Tylenol or Motrin for pain.  Injection given into the left knee.  Call office in one week if no better from the injection. We will order an MRI for the left knee possible medial mensicus.    We are committed to providing you the best care possible.  If you receive a survey after visiting one of our offices, please take time to share your experience concerning your physician office visit.  These surveys are confidential and no health information about you is shared.  We are eager to improve for you and we are counting on your feedback to help make that happen.

## 2024-05-09 NOTE — PROGRESS NOTES
Review of Systems   Constitutional: Negative.    HENT: Negative.     Eyes: Negative.    Respiratory: Negative.     Cardiovascular: Negative.    Gastrointestinal: Negative.    Genitourinary: Negative.    Musculoskeletal:  Positive for arthralgias and myalgias.   Skin: Negative.    Neurological: Negative.    Psychiatric/Behavioral: Negative.           HPI:  Olaf Vieyra is a 58 y.o. male that presents to the office today with recurrent left knee pain over the medial aspect of the left knee with popping and clicking sound within the knee.  He states that the issue is over the medial aspect of the knee and it clicks and pops.  He did have a steroid injection about 3 months ago which did not help a whole lot, but it did help some.  He would like to maybe try another steroid injection in the knee today.    Past Medical History:   Diagnosis Date    Hypertension     Stroke (HCC) 2005       Past Surgical History:   Procedure Laterality Date    KNEE ARTHROSCOPY Left 6/15/2023    LEFT KNEE ARTHROSCOPY WITH PARTIAL MEDIAL MENISCECTOMY performed by Tru Vallejo DO at [facility] OR       No family history on file.    Social History     Socioeconomic History    Marital status:      Spouse name: None    Number of children: None    Years of education: None    Highest education level: None   Tobacco Use    Smoking status: Never    Smokeless tobacco: Never   Vaping Use    Vaping Use: Never used   Substance and Sexual Activity    Alcohol use: Not Currently    Drug use: Never       Current Outpatient Medications   Medication Sig Dispense Refill    losartan (COZAAR) 100 MG tablet       metoprolol succinate (TOPROL XL) 50 MG extended release tablet Take 1 tablet by mouth daily      amLODIPine (NORVASC) 10 MG tablet Take 1 tablet by mouth daily      losartan-hydroCHLOROthiazide (HYZAAR) 100-25 MG per tablet       ibuprofen (ADVIL;MOTRIN) 800 MG tablet Take 1 tablet by mouth every 6 hours as needed for Pain      sildenafil (VIAGRA)

## 2024-08-07 ENCOUNTER — OFFICE VISIT (OUTPATIENT)
Dept: ORTHOPEDIC SURGERY | Age: 59
End: 2024-08-07

## 2024-08-07 VITALS — BODY MASS INDEX: 34.87 KG/M2 | OXYGEN SATURATION: 96 % | HEART RATE: 68 BPM | HEIGHT: 71 IN | RESPIRATION RATE: 16 BRPM

## 2024-08-07 DIAGNOSIS — M17.12 ARTHRITIS OF KNEE, LEFT: Primary | ICD-10-CM

## 2024-08-07 RX ORDER — TRIAMCINOLONE ACETONIDE 40 MG/ML
40 INJECTION, SUSPENSION INTRA-ARTICULAR; INTRAMUSCULAR ONCE
Status: COMPLETED | OUTPATIENT
Start: 2024-08-07 | End: 2024-08-07

## 2024-08-07 RX ADMIN — TRIAMCINOLONE ACETONIDE 40 MG: 40 INJECTION, SUSPENSION INTRA-ARTICULAR; INTRAMUSCULAR at 15:10

## 2024-08-07 ASSESSMENT — ENCOUNTER SYMPTOMS
CHEST TIGHTNESS: 0
EYE REDNESS: 0
ABDOMINAL PAIN: 0
BACK PAIN: 0
COLOR CHANGE: 0

## 2024-08-07 NOTE — PATIENT INSTRUCTIONS
Continue weight-bearing as tolerated.  Continue range of motion exercises as instructed.  Ice and elevate as needed.  Tylenol or Motrin for pain.   Cortisone injection given in left knee.  Follow up as needed.    We are committed to providing you the best care possible.     If you receive a survey after visiting one of our offices, please take time to share your experience concerning your physician office visit.  These surveys are confidential and no health information about you is shared.     We are eager to improve for you and we are counting on your feedback to help make that happen.

## 2024-08-07 NOTE — PROGRESS NOTES
Patient seen in office today for LT knee pain. Would like a cortisone injection. Pain is on medial side of knee joint. No new injury. 7/10 pain level. Taking ibuprofen to help alleviate.

## 2024-08-07 NOTE — PROGRESS NOTES
Subjective:      Patient ID: Olaf Vieyra is a 58 y.o. male.    Patient seen in office today for LT knee pain. Would like a cortisone injection. Pain is on medial side of knee joint. No new injury. 7/10 pain level. Taking ibuprofen to help alleviate.    He comes in today for a recheck of his left knee pain.  He states that he has noticed more improvement with the cortisone injections than he did with the gel injections.  He states that his last cortisone injection helped until about 2 weeks ago.  He states that now he has had a return of the dull, aching pain primarily along the medial aspect of his left knee.  He also has some snapping along the medial aspect of the knee with flexion.  He states that he is having difficulty standing for prolonged period of time and perform his daily activities due to the pain.    Patient denies any new injury to the involved extremity/ joint, denies numbness or tingling in the involved extremity and denies fever or chills.              Review of Systems   Constitutional:  Negative for activity change, chills and fever.   HENT:  Negative for congestion and drooling.    Eyes:  Negative for redness.   Respiratory:  Negative for chest tightness.    Cardiovascular:  Negative for chest pain.   Gastrointestinal:  Negative for abdominal pain.   Endocrine: Negative for cold intolerance and heat intolerance.   Musculoskeletal:  Positive for arthralgias and myalgias. Negative for back pain, gait problem and joint swelling.   Skin:  Negative for color change, pallor, rash and wound.   Neurological:  Negative for weakness and numbness.   Psychiatric/Behavioral:  Negative for confusion.        Past Medical History:   Diagnosis Date    Hypertension     Stroke ([facility]) 2005       Objective:   Physical Exam  Constitutional:       Appearance: He is well-developed.   HENT:      Head: Normocephalic.      Nose: No congestion.   Eyes:      Pupils: Pupils are equal, round, and reactive to light.

## 2024-11-11 ENCOUNTER — OFFICE VISIT (OUTPATIENT)
Dept: ORTHOPEDIC SURGERY | Age: 59
End: 2024-11-11
Payer: COMMERCIAL

## 2024-11-11 VITALS — WEIGHT: 261 LBS | HEIGHT: 71 IN | OXYGEN SATURATION: 96 % | BODY MASS INDEX: 36.54 KG/M2 | HEART RATE: 64 BPM

## 2024-11-11 DIAGNOSIS — M17.12 ARTHRITIS OF KNEE, LEFT: Primary | ICD-10-CM

## 2024-11-11 PROCEDURE — G8427 DOCREV CUR MEDS BY ELIG CLIN: HCPCS | Performed by: ORTHOPAEDIC SURGERY

## 2024-11-11 PROCEDURE — 20610 DRAIN/INJ JOINT/BURSA W/O US: CPT | Performed by: ORTHOPAEDIC SURGERY

## 2024-11-11 PROCEDURE — 1036F TOBACCO NON-USER: CPT | Performed by: ORTHOPAEDIC SURGERY

## 2024-11-11 PROCEDURE — G8417 CALC BMI ABV UP PARAM F/U: HCPCS | Performed by: ORTHOPAEDIC SURGERY

## 2024-11-11 PROCEDURE — 99213 OFFICE O/P EST LOW 20 MIN: CPT | Performed by: ORTHOPAEDIC SURGERY

## 2024-11-11 PROCEDURE — 3017F COLORECTAL CA SCREEN DOC REV: CPT | Performed by: ORTHOPAEDIC SURGERY

## 2024-11-11 PROCEDURE — G8484 FLU IMMUNIZE NO ADMIN: HCPCS | Performed by: ORTHOPAEDIC SURGERY

## 2024-11-11 RX ORDER — TRIAMCINOLONE ACETONIDE 40 MG/ML
40 INJECTION, SUSPENSION INTRA-ARTICULAR; INTRAMUSCULAR ONCE
Status: COMPLETED | OUTPATIENT
Start: 2024-11-11 | End: 2024-11-11

## 2024-11-11 RX ADMIN — TRIAMCINOLONE ACETONIDE 40 MG: 40 INJECTION, SUSPENSION INTRA-ARTICULAR; INTRAMUSCULAR at 09:31

## 2024-11-11 ASSESSMENT — ENCOUNTER SYMPTOMS
ABDOMINAL PAIN: 0
EYE REDNESS: 0
CHEST TIGHTNESS: 0
COLOR CHANGE: 0
BACK PAIN: 0

## 2024-11-11 NOTE — PROGRESS NOTES
Patient returns to the office with left knee pain. Pt stated his job requires a lot of activities up and down which increase the pain level. Pt stated he notices swelling at time and did see a benefit from the previous injection.   
Swelling present. No deformity. Normal range of motion.      Cervical back: Normal range of motion.      Right hip: Normal.      Left hip: Normal.      Right knee: No swelling, deformity, effusion, erythema, ecchymosis, lacerations or bony tenderness. Normal range of motion. No tenderness. No medial joint line, lateral joint line, MCL, LCL or patellar tendon tenderness. No LCL laxity or MCL laxity. Normal alignment and normal patellar mobility.      Left knee: Swelling and bony tenderness present. No deformity, effusion, erythema, ecchymosis or lacerations. Normal range of motion. Tenderness present over the medial joint line. No lateral joint line, MCL, LCL or patellar tendon tenderness. No LCL laxity or MCL laxity.Normal alignment and normal patellar mobility.      Instability Tests: Medial Pantera test negative.   Skin:     General: Skin is warm and dry.      Capillary Refill: Capillary refill takes less than 2 seconds.      Coloration: Skin is not pale.      Findings: No erythema or rash.   Neurological:      Mental Status: He is alert and oriented to person, place, and time.      Sensory: No sensory deficit.      Motor: No weakness.       Left knee-Skin intact with no erythema, ecchymosis or lacerations present.  0-130    XR KNEE LEFT (3 VIEWS)    Result Date: 11/11/2024  XRAY X-ray 3 views of the left knee obtained and reviewed by me today in the office demonstrates age appropriate bone density throughout with severe degenerative changes with medial joint space collapse and approximately 75% patellofemoral joint space narrowing with early osteophyte formation in the patellofemoral compartment with normal tracking of the patella, no acute osseous abnormalities. Impression: Severe degenerative changes of the left knee as above with no acute process.          Assessment:      Left knee OA, severe      Plan:   Assessment & Plan   I discussed with him today his x-ray findings.  I explained to him that the

## 2024-11-11 NOTE — PATIENT INSTRUCTIONS
Continue weight-bearing as tolerated.  Continue range of motion exercises as instructed.  Ice and elevate as needed.  Tylenol or Motrin for pain.  Follow up in 3 months.

## 2025-01-07 ENCOUNTER — OFFICE VISIT (OUTPATIENT)
Dept: ORTHOPEDIC SURGERY | Age: 60
End: 2025-01-07
Payer: COMMERCIAL

## 2025-01-07 VITALS — HEIGHT: 71 IN | OXYGEN SATURATION: 94 % | BODY MASS INDEX: 36.4 KG/M2 | HEART RATE: 84 BPM

## 2025-01-07 DIAGNOSIS — M17.12 ARTHRITIS OF KNEE, LEFT: Primary | ICD-10-CM

## 2025-01-07 PROCEDURE — 3017F COLORECTAL CA SCREEN DOC REV: CPT | Performed by: ORTHOPAEDIC SURGERY

## 2025-01-07 PROCEDURE — 99214 OFFICE O/P EST MOD 30 MIN: CPT | Performed by: ORTHOPAEDIC SURGERY

## 2025-01-07 PROCEDURE — 1036F TOBACCO NON-USER: CPT | Performed by: ORTHOPAEDIC SURGERY

## 2025-01-07 PROCEDURE — G8417 CALC BMI ABV UP PARAM F/U: HCPCS | Performed by: ORTHOPAEDIC SURGERY

## 2025-01-07 PROCEDURE — G8427 DOCREV CUR MEDS BY ELIG CLIN: HCPCS | Performed by: ORTHOPAEDIC SURGERY

## 2025-01-07 PROCEDURE — M1308 PR FLU IMMUNIZE NO ADMIN: HCPCS | Performed by: ORTHOPAEDIC SURGERY

## 2025-01-07 RX ORDER — IBUPROFEN 800 MG/1
800 TABLET, FILM COATED ORAL EVERY 8 HOURS PRN
Qty: 60 TABLET | Refills: 1 | Status: SHIPPED | OUTPATIENT
Start: 2025-01-07

## 2025-01-07 ASSESSMENT — ENCOUNTER SYMPTOMS
EYE PAIN: 0
EYE REDNESS: 0
SHORTNESS OF BREATH: 0
CHEST TIGHTNESS: 0
WHEEZING: 0
COLOR CHANGE: 0
VOMITING: 0

## 2025-01-07 NOTE — PROGRESS NOTES
Patient presents to the office with left knee pain. Pt received an injection from Dr. Vallejo about 2 months ago and the pain has returned. He also has had a round of gel injections which are short-lived. Pt stated that he has had a knee arthroscopy that helped with stability but still deals with consistent pain.

## 2025-01-07 NOTE — PATIENT INSTRUCTIONS
We will schedule surgery at soonest convenience. If you have any questions regarding your surgery please call our office and ask to speak with Adele [phone]

## 2025-01-07 NOTE — PROGRESS NOTES
1/7/2025   Chief Complaint   Patient presents with    Knee Pain     Left         History of Present Illness:                             Olaf Vieyra is a 59 y.o. male who presents today for evaluation of his left knee pain and swelling and stiffness.  Symptoms have been ongoing for several years progressively worsening with severe symptoms extending over the last year.  He has been through extensive treatments in the past along with knee injections and knee arthroscopy.  He is having progressive worsening problems which make it difficult for him to perform his job duties.  He has trouble going up and down stairs getting up from a seated position.  He has constant deep aching pain along the medial joint line which is occasionally severe and debilitating.  He has trouble exercising and has pain on a daily basis which is getting worse progressively.  He is afraid the knee may give out and cause him to fall.    He has been through knee arthroscopy for meniscus tear and was told that time that he had arthritis in the knee.  His knee scope was performed in June 2023.  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  He has been through extensive conservative treatments and feels that these have failed and is now interested in discussing more definitive surgical intervention.        Patient presents to the office with left knee pain. Pt received an injection from Dr. Vallejo about 2 months ago and the pain has returned. He also has had a round of gel injections which are short-lived. Pt stated that he has had a knee arthroscopy that helped with stability but still deals with consistent pain.     Medical History  Patient's medications, allergies, past medical, surgical, social and family histories were reviewed and updated as appropriate.    Past Medical History:   Diagnosis Date    Hypertension     Stroke (HCC) 2005     Past Surgical History:   Procedure Laterality

## 2025-01-13 ENCOUNTER — TELEPHONE (OUTPATIENT)
Dept: ORTHOPEDIC SURGERY | Age: 60
End: 2025-01-13

## 2025-01-13 ENCOUNTER — PREP FOR PROCEDURE (OUTPATIENT)
Dept: ORTHOPEDIC SURGERY | Age: 60
End: 2025-01-13

## 2025-01-13 DIAGNOSIS — G89.29 CHRONIC PAIN OF LEFT KNEE: ICD-10-CM

## 2025-01-13 DIAGNOSIS — M25.562 CHRONIC PAIN OF LEFT KNEE: ICD-10-CM

## 2025-01-13 DIAGNOSIS — Z01.818 PREOPERATIVE TESTING: Primary | ICD-10-CM

## 2025-01-13 DIAGNOSIS — M17.12 PRIMARY OSTEOARTHRITIS OF LEFT KNEE: Primary | ICD-10-CM

## 2025-01-13 NOTE — TELEPHONE ENCOUNTER
Scheduled patient for:    Left Total Knee Arthroplasty  CPT: 83235  ICD 10: M17.12; M25.562  Surgery Date: 5/5/2025  Surgeon: Shae  Facility: [facility]  Anesthesia: Spinal/Nerve Block  Product: Telerik    Physical Therapy: Viktor, at request of patient, order created 1/13/25  CT of Lt Knee for robot protocol, order created 1/13/25    Clearances sent to:  PCP: Addison    Insurance: [employer]  Prior auth started on 1/13/25 via online lookup tool.  No Prior Auth is Required

## 2025-01-14 RX ORDER — ACETAMINOPHEN 325 MG/1
1000 TABLET ORAL ONCE
OUTPATIENT
Start: 2025-01-14 | End: 2025-01-14

## 2025-01-14 RX ORDER — SODIUM CHLORIDE 0.9 % (FLUSH) 0.9 %
5-40 SYRINGE (ML) INJECTION PRN
OUTPATIENT
Start: 2025-01-14

## 2025-01-14 RX ORDER — SODIUM CHLORIDE 9 MG/ML
INJECTION, SOLUTION INTRAVENOUS PRN
OUTPATIENT
Start: 2025-01-14

## 2025-01-14 RX ORDER — SODIUM CHLORIDE 0.9 % (FLUSH) 0.9 %
5-40 SYRINGE (ML) INJECTION EVERY 12 HOURS SCHEDULED
OUTPATIENT
Start: 2025-01-14

## 2025-02-03 ENCOUNTER — HOSPITAL ENCOUNTER (OUTPATIENT)
Dept: CT IMAGING | Age: 60
Discharge: HOME | End: 2025-02-03
Attending: ORTHOPAEDIC SURGERY
Payer: COMMERCIAL

## 2025-02-03 DIAGNOSIS — M25.562 CHRONIC PAIN OF LEFT KNEE: ICD-10-CM

## 2025-02-03 DIAGNOSIS — M17.12 PRIMARY OSTEOARTHRITIS OF LEFT KNEE: ICD-10-CM

## 2025-02-03 DIAGNOSIS — G89.29 CHRONIC PAIN OF LEFT KNEE: ICD-10-CM

## 2025-02-03 PROCEDURE — 73700 CT LOWER EXTREMITY W/O DYE: CPT

## 2025-02-11 ENCOUNTER — TELEPHONE (OUTPATIENT)
Dept: ORTHOPEDIC SURGERY | Age: 60
End: 2025-02-11

## 2025-02-11 NOTE — TELEPHONE ENCOUNTER
Patient called into office asking to move his surgery up sooner. He is currently scheduled for LT TKA on 5/5/2025 but has been rescheduled for 4/7/2025. Evonne notified. All appts associated with this appt has been changed to reflect new date.

## 2025-03-05 DIAGNOSIS — M17.12 ARTHRITIS OF KNEE, LEFT: Primary | ICD-10-CM

## 2025-03-19 NOTE — PROGRESS NOTES
3/19/25 - .LM with my call-back # concerning  PAT on 3/26/25 and surgery @ [facility] on  4/7/25.  Please call the PAT Nurse for a phone assessment and surgery instructions.

## 2025-03-20 NOTE — PROGRESS NOTES
3/20/25 - 2nd message:  LM of pre-testing on 3/26/25 between [id number].   Bring insurance card and picture ID. Check in at the information desk in the lobby upon your arrival. You can eat and take morning medications. Please call the PAT Nurse.

## 2025-03-24 ENCOUNTER — CLINICAL SUPPORT (OUTPATIENT)
Dept: ORTHOPEDIC SURGERY | Age: 60
End: 2025-03-24

## 2025-03-24 VITALS — BODY MASS INDEX: 36.4 KG/M2 | WEIGHT: 261 LBS

## 2025-03-24 ASSESSMENT — PROMIS GLOBAL HEALTH SCALE
IN THE PAST 7 DAYS, HOW WOULD YOU RATE YOUR PAIN ON AVERAGE [ON A SCALE FROM 0 (NO PAIN) TO 10 (WORST IMAGINABLE PAIN)]?: 9
IN THE PAST 7 DAYS, HOW WOULD YOU RATE YOUR FATIGUE ON AVERAGE [ON A SCALE FROM 1 (NONE) TO 5 (VERY SEVERE)]?: MODERATE
IN GENERAL, WOULD YOU SAY YOUR QUALITY OF LIFE IS...[ON A SCALE OF 1 (POOR) TO 5 (EXCELLENT)]: GOOD
HOW IS THE PROMIS V1.1 BEING ADMINISTERED?: ELECTRONIC
IN GENERAL, HOW WOULD YOU RATE YOUR SATISFACTION WITH YOUR SOCIAL ACTIVITIES AND RELATIONSHIPS [ON A SCALE OF 1 (POOR) TO 5 (EXCELLENT)]?: VERY GOOD
TO WHAT EXTENT ARE YOU ABLE TO CARRY OUT YOUR EVERYDAY PHYSICAL ACTIVITIES SUCH AS WALKING, CLIMBING STAIRS, CARRYING GROCERIES, OR MOVING A CHAIR [ON A SCALE OF 1 (NOT AT ALL) TO 5 (COMPLETELY)]?: A LITTLE
IN THE PAST 7 DAYS, HOW OFTEN HAVE YOU BEEN BOTHERED BY EMOTIONAL PROBLEMS, SUCH AS FEELING ANXIOUS, DEPRESSED, OR IRRITABLE [ON A SCALE FROM 1 (NEVER) TO 5 (ALWAYS)]?: RARELY
IN THE PAST 7 DAYS, HOW WOULD YOU RATE YOUR PAIN ON AVERAGE [ON A SCALE FROM 0 (NO PAIN) TO 10 (WORST IMAGINABLE PAIN)]?: 9
IN GENERAL, WOULD YOU SAY YOUR HEALTH IS...[ON A SCALE OF 1 (POOR) TO 5 (EXCELLENT)]: VERY GOOD
TO WHAT EXTENT ARE YOU ABLE TO CARRY OUT YOUR EVERYDAY PHYSICAL ACTIVITIES SUCH AS WALKING, CLIMBING STAIRS, CARRYING GROCERIES, OR MOVING A CHAIR [ON A SCALE OF 1 (NOT AT ALL) TO 5 (COMPLETELY)]?: A LITTLE
IN GENERAL, HOW WOULD YOU RATE YOUR MENTAL HEALTH, INCLUDING YOUR MOOD AND YOUR ABILITY TO THINK [ON A SCALE OF 1 (POOR) TO 5 (EXCELLENT)]?: GOOD
SUM OF RESPONSES TO QUESTIONS 3, 6, 7, & 8: 17
IN GENERAL, PLEASE RATE HOW WELL YOU CARRY OUT YOUR USUAL SOCIAL ACTIVITIES (INCLUDES ACTIVITIES AT HOME, AT WORK, AND IN YOUR COMMUNITY, AND RESPONSIBILITIES AS A PARENT, CHILD, SPOUSE, EMPLOYEE, FRIEND, ETC) [ON A SCALE OF 1 (POOR) TO 5 (EXCELLENT)]?: VERY GOOD
WHO IS THE PERSON COMPLETING THE PROMIS V1.1 SURVEY?: SELF
HOW IS THE PROMIS V1.1 BEING ADMINISTERED?: ELECTRONIC
IN THE PAST 7 DAYS, HOW OFTEN HAVE YOU BEEN BOTHERED BY EMOTIONAL PROBLEMS, SUCH AS FEELING ANXIOUS, DEPRESSED, OR IRRITABLE [ON A SCALE FROM 1 (NEVER) TO 5 (ALWAYS)]?: RARELY
IN GENERAL, HOW WOULD YOU RATE YOUR SATISFACTION WITH YOUR SOCIAL ACTIVITIES AND RELATIONSHIPS [ON A SCALE OF 1 (POOR) TO 5 (EXCELLENT)]?: VERY GOOD
IN THE PAST 7 DAYS, HOW WOULD YOU RATE YOUR FATIGUE ON AVERAGE [ON A SCALE FROM 1 (NONE) TO 5 (VERY SEVERE)]?: MODERATE
WHO IS THE PERSON COMPLETING THE PROMIS V1.1 SURVEY?: SELF
SUM OF RESPONSES TO QUESTIONS 2, 4, 5, & 10: 14
IN GENERAL, HOW WOULD YOU RATE YOUR PHYSICAL HEALTH [ON A SCALE OF 1 (POOR) TO 5 (EXCELLENT)]?: GOOD
IN GENERAL, PLEASE RATE HOW WELL YOU CARRY OUT YOUR USUAL SOCIAL ACTIVITIES (INCLUDES ACTIVITIES AT HOME, AT WORK, AND IN YOUR COMMUNITY, AND RESPONSIBILITIES AS A PARENT, CHILD, SPOUSE, EMPLOYEE, FRIEND, ETC) [ON A SCALE OF 1 (POOR) TO 5 (EXCELLENT)]?: VERY GOOD

## 2025-03-24 ASSESSMENT — KOOS JR
GOING UP OR DOWN STAIRS: EXTREME
TWISING OR PIVOTING ON KNEE: SEVERE
HOW SEVERE IS YOUR KNEE STIFFNESS AFTER FIRST WAKING IN MORNING: SEVERE
KOOS JR TOTAL INTERVAL SCORE: 34.174
STANDING UPRIGHT: MODERATE
STRAIGHTENING KNEE FULLY: MODERATE
BENDING TO THE FLOOR TO PICK UP OBJECT: SEVERE
RISING FROM SITTING: EXTREME

## 2025-03-24 NOTE — PROGRESS NOTES
Surgery pre-testing was changed to 3/31/25 between 0730 till 1400    Surgery @ [facility] on 4/7/25 you will be called 4/4/25 with times    NOTHING TO EAT OR DRINK AFTER MIDNIGHT DAY OF SURGERY    1. Enter thru the hospital main entrance on day of surgery, check in at the Information Desk. If you arrive prior to 6:00am, enter thru the ER entrance.    2. Follow the directions as prescribed by the doctor for your procedure and medications.         Morning of surgery take:   Amlodipine & Metoprolol with sips of water         Stop vitamins, supplements and NSAIDS:  3/31/25    3. Check with your Doctor regarding stopping blood thinners and follow their instructions.    4. Do not smoke, vape or use chewing tobacco morning of surgery. Do not drink any alcoholic beverages 24 hours prior to surgery.       This includes NA Beer. No street drugs 7 days prior to surgery.    5. If you have dentures, contacts of glasses they will be removed before going to the OR; please bring a case.    6. Please bring picture ID, insurance card, paperwork from the doctor’s office (H & P, Consent, & card for implantable devices).    7. Take a shower with an antibacterial soap the night before surgery and the morning of surgery. Do not put anything on your skin      After your morning shower.    8. You will need a responsible adult to drive you home and check on you after surgery.

## 2025-03-24 NOTE — PROGRESS NOTES
CELESTINE, Left TKA, DOS 5/5/2025    Patient would like to discharge from [facility] with orders to outpatient PT, with [facility] PT in [place].     All questions and concerns of the patient's have been answered at this time to the best of my ability. Patient is aware they may contact this nurse at her direct number given to them at anytime with questions or concerns moving forward.     CHG bathing soap given to patient with instructions.     Present in appt with patient today is: no one     Walker order and handicap letter were given to patient during appointment.     Post op appts were reviewed with patient as well.

## 2025-03-26 ENCOUNTER — HOSPITAL ENCOUNTER (OUTPATIENT)
Dept: PREADMISSION TESTING | Age: 60
Discharge: HOME | End: 2025-03-26

## 2025-03-31 ENCOUNTER — HOSPITAL ENCOUNTER (OUTPATIENT)
Dept: PREADMISSION TESTING | Age: 60
Discharge: HOME | End: 2025-04-04
Payer: COMMERCIAL

## 2025-03-31 ENCOUNTER — HOSPITAL ENCOUNTER (OUTPATIENT)
Age: 60
Discharge: HOME | End: 2025-04-02
Payer: COMMERCIAL

## 2025-03-31 ENCOUNTER — HOSPITAL ENCOUNTER (OUTPATIENT)
Age: 60
Discharge: HOME | End: 2025-03-31
Payer: COMMERCIAL

## 2025-03-31 ENCOUNTER — HOSPITAL ENCOUNTER (OUTPATIENT)
Dept: GENERAL RADIOLOGY | Age: 60
Discharge: HOME | End: 2025-03-31
Payer: COMMERCIAL

## 2025-03-31 DIAGNOSIS — Z01.818 PREOPERATIVE TESTING: ICD-10-CM

## 2025-03-31 LAB
ALBUMIN SERPL-MCNC: 4.3 G/DL (ref 3.4–5)
ALBUMIN/GLOB SERPL: 1.5 {RATIO} (ref 1.1–2.2)
ALP SERPL-CCNC: 53 U/L (ref 40–129)
ALT SERPL-CCNC: 25 U/L (ref 10–40)
ANION GAP SERPL CALCULATED.3IONS-SCNC: 11 MMOL/L (ref 9–17)
AST SERPL-CCNC: 20 U/L (ref 15–37)
BASOPHILS # BLD: 0.05 K/UL
BASOPHILS NFR BLD: 1 % (ref 0–1)
BILIRUB SERPL-MCNC: 0.6 MG/DL (ref 0–1)
BILIRUB UR QL STRIP: NEGATIVE
BUN SERPL-MCNC: 13 MG/DL (ref 7–20)
CALCIUM SERPL-MCNC: 9.2 MG/DL (ref 8.3–10.6)
CHLORIDE SERPL-SCNC: 104 MMOL/L (ref 99–110)
CLARITY UR: CLEAR
CO2 SERPL-SCNC: 24 MMOL/L (ref 21–32)
COLOR UR: YELLOW
COMMENT: NORMAL
CREAT SERPL-MCNC: 0.7 MG/DL (ref 0.9–1.3)
EOSINOPHIL # BLD: 0.23 K/UL
EOSINOPHILS RELATIVE PERCENT: 5 % (ref 0–3)
ERYTHROCYTE [DISTWIDTH] IN BLOOD BY AUTOMATED COUNT: 14.6 % (ref 11.7–14.9)
GFR, ESTIMATED: >90 ML/MIN/1.73M2
GLUCOSE SERPL-MCNC: 88 MG/DL (ref 74–99)
GLUCOSE UR STRIP-MCNC: NEGATIVE MG/DL
HCT VFR BLD AUTO: 44.4 % (ref 42–52)
HGB BLD-MCNC: 14.4 G/DL (ref 13.5–18)
HGB UR QL STRIP.AUTO: NEGATIVE
IMM GRANULOCYTES # BLD AUTO: 0.02 K/UL
IMM GRANULOCYTES NFR BLD: 0 %
KETONES UR STRIP-MCNC: NEGATIVE MG/DL
LEUKOCYTE ESTERASE UR QL STRIP: NEGATIVE
LYMPHOCYTES NFR BLD: 1.18 K/UL
LYMPHOCYTES RELATIVE PERCENT: 24 % (ref 24–44)
MCH RBC QN AUTO: 27.9 PG (ref 27–31)
MCHC RBC AUTO-ENTMCNC: 32.4 G/DL (ref 32–36)
MCV RBC AUTO: 85.9 FL (ref 78–100)
MONOCYTES NFR BLD: 0.47 K/UL
MONOCYTES NFR BLD: 9 % (ref 0–4)
NEUTROPHILS NFR BLD: 61 % (ref 36–66)
NEUTS SEG NFR BLD: 3.04 K/UL
NITRITE UR QL STRIP: NEGATIVE
PH UR STRIP: 6 [PH] (ref 5–8)
PLATELET # BLD AUTO: 244 K/UL (ref 140–440)
PMV BLD AUTO: 9.5 FL (ref 7.5–11.1)
POTASSIUM SERPL-SCNC: 4 MMOL/L (ref 3.5–5.1)
PROT SERPL-MCNC: 7.1 G/DL (ref 6.4–8.2)
PROT UR STRIP-MCNC: NEGATIVE MG/DL
RBC # BLD AUTO: 5.17 M/UL (ref 4.6–6.2)
SODIUM SERPL-SCNC: 138 MMOL/L (ref 136–145)
SP GR UR STRIP: 1.02 (ref 1–1.03)
UROBILINOGEN UR STRIP-ACNC: 0.2 EU/DL (ref 0–1)
WBC OTHER # BLD: 5 K/UL (ref 4–10.5)

## 2025-03-31 PROCEDURE — 85025 COMPLETE CBC W/AUTO DIFF WBC: CPT

## 2025-03-31 PROCEDURE — 71046 X-RAY EXAM CHEST 2 VIEWS: CPT

## 2025-03-31 PROCEDURE — 81003 URINALYSIS AUTO W/O SCOPE: CPT

## 2025-03-31 PROCEDURE — 93005 ELECTROCARDIOGRAM TRACING: CPT

## 2025-03-31 PROCEDURE — 87086 URINE CULTURE/COLONY COUNT: CPT

## 2025-03-31 PROCEDURE — 80053 COMPREHEN METABOLIC PANEL: CPT

## 2025-04-01 LAB
MICROORGANISM SPEC CULT: NORMAL
SERVICE CMNT-IMP: NORMAL
SPECIMEN DESCRIPTION: NORMAL

## 2025-04-02 LAB
EKG ATRIAL RATE: 56 BPM
EKG DIAGNOSIS: NORMAL
EKG P AXIS: 9 DEGREES
EKG P-R INTERVAL: 150 MS
EKG Q-T INTERVAL: 436 MS
EKG QRS DURATION: 106 MS
EKG QTC CALCULATION (BAZETT): 420 MS
EKG R AXIS: -12 DEGREES
EKG T AXIS: 17 DEGREES
EKG VENTRICULAR RATE: 56 BPM

## 2025-04-04 ENCOUNTER — ANESTHESIA EVENT (OUTPATIENT)
Dept: OPERATING ROOM | Age: 60
End: 2025-04-04
Payer: COMMERCIAL

## 2025-04-04 NOTE — ANESTHESIA PRE PROCEDURE
Department of Anesthesiology  Preprocedure Note       Name:  Olaf Vieyra   Age:  59 y.o.  :  1965                                          MRN:  [id number]         Date:  2025      Surgeon: Surgeon(s):  Emil Kaufman MD    Procedure: Procedure(s):  KNEE TOTAL ARTHROPLASTY ROBOTIC    Medications prior to admission:   Prior to Admission medications    Medication Sig Start Date End Date Taking? Authorizing Provider   ibuprofen (ADVIL;MOTRIN) 800 MG tablet Take 1 tablet by mouth every 8 hours as needed for Pain 25   Emil Kaufman MD   losartan (COZAAR) 100 MG tablet  10/18/23   Sarah Roque MD   metoprolol succinate (TOPROL XL) 100 MG extended release tablet Take 1 tablet by mouth daily    Sarah Roque MD   amLODIPine (NORVASC) 10 MG tablet Take 1 tablet by mouth daily    Sarah Roque MD   losartan-hydroCHLOROthiazide (HYZAAR) 100-25 MG per tablet  23   Sarah Roque MD   sildenafil (VIAGRA) 100 MG tablet Take 1 tablet by mouth daily as needed  Patient not taking: Reported on 2024  Sarah Roque MD   ibuprofen (ADVIL;MOTRIN) 800 MG tablet Take 1 tablet by mouth every 6 hours as needed for Pain    Sarah Roque MD   indomethacin (INDOCIN) 25 MG capsule Take 1 capsule by mouth 2 times daily (with meals)  Patient not taking: Reported on 2023    Sarah Roque MD       Current medications:    Current Facility-Administered Medications   Medication Dose Route Frequency Provider Last Rate Last Admin   • triamcinolone acetonide (KENALOG-40) injection 40 mg  40 mg Intra-artICUlar Once [place], Olaf KLINE PA-C         Current Outpatient Medications   Medication Sig Dispense Refill   • ibuprofen (ADVIL;MOTRIN) 800 MG tablet Take 1 tablet by mouth every 8 hours as needed for Pain 60 tablet 1   • losartan (COZAAR) 100 MG tablet      • metoprolol succinate (TOPROL XL) 100 MG extended release tablet Take 1 tablet by mouth

## 2025-04-07 ENCOUNTER — APPOINTMENT (OUTPATIENT)
Dept: GENERAL RADIOLOGY | Age: 60
End: 2025-04-07
Attending: ORTHOPAEDIC SURGERY
Payer: COMMERCIAL

## 2025-04-07 ENCOUNTER — HOSPITAL ENCOUNTER (OUTPATIENT)
Age: 60
Discharge: HOME | End: 2025-04-07
Attending: ORTHOPAEDIC SURGERY | Admitting: ORTHOPAEDIC SURGERY
Payer: COMMERCIAL

## 2025-04-07 ENCOUNTER — ANESTHESIA (OUTPATIENT)
Dept: OPERATING ROOM | Age: 60
End: 2025-04-07
Payer: COMMERCIAL

## 2025-04-07 VITALS
HEART RATE: 59 BPM | OXYGEN SATURATION: 93 % | RESPIRATION RATE: 20 BRPM | TEMPERATURE: 97.6 F | DIASTOLIC BLOOD PRESSURE: 98 MMHG | SYSTOLIC BLOOD PRESSURE: 142 MMHG

## 2025-04-07 DIAGNOSIS — Z96.652 S/P TKR (TOTAL KNEE REPLACEMENT), LEFT: Primary | ICD-10-CM

## 2025-04-07 PROBLEM — M17.12 PRIMARY OSTEOARTHRITIS OF LEFT KNEE: Status: ACTIVE | Noted: 2025-04-07

## 2025-04-07 PROCEDURE — 27447 TOTAL KNEE ARTHROPLASTY: CPT | Performed by: ORTHOPAEDIC SURGERY

## 2025-04-07 PROCEDURE — 2580000003 HC RX 258

## 2025-04-07 PROCEDURE — 2500000003 HC RX 250 WO HCPCS: Performed by: ORTHOPAEDIC SURGERY

## 2025-04-07 PROCEDURE — 6370000000 HC RX 637 (ALT 250 FOR IP): Performed by: ORTHOPAEDIC SURGERY

## 2025-04-07 PROCEDURE — C1776 JOINT DEVICE (IMPLANTABLE): HCPCS | Performed by: ORTHOPAEDIC SURGERY

## 2025-04-07 PROCEDURE — 73560 X-RAY EXAM OF KNEE 1 OR 2: CPT

## 2025-04-07 PROCEDURE — S2900 ROBOTIC SURGICAL SYSTEM: HCPCS | Performed by: ORTHOPAEDIC SURGERY

## 2025-04-07 PROCEDURE — 3700000000 HC ANESTHESIA ATTENDED CARE: Performed by: ORTHOPAEDIC SURGERY

## 2025-04-07 PROCEDURE — 0055T BONE SRGRY CMPTR CT/MRI IMAG: CPT | Performed by: ORTHOPAEDIC SURGERY

## 2025-04-07 PROCEDURE — 2709999900 HC NON-CHARGEABLE SUPPLY: Performed by: ORTHOPAEDIC SURGERY

## 2025-04-07 PROCEDURE — 6360000002 HC RX W HCPCS: Performed by: STUDENT IN AN ORGANIZED HEALTH CARE EDUCATION/TRAINING PROGRAM

## 2025-04-07 PROCEDURE — P9045 ALBUMIN (HUMAN), 5%, 250 ML: HCPCS

## 2025-04-07 PROCEDURE — 3700000001 HC ADD 15 MINUTES (ANESTHESIA): Performed by: ORTHOPAEDIC SURGERY

## 2025-04-07 PROCEDURE — 7100000000 HC PACU RECOVERY - FIRST 15 MIN: Performed by: ORTHOPAEDIC SURGERY

## 2025-04-07 PROCEDURE — 7100000010 HC PHASE II RECOVERY - FIRST 15 MIN: Performed by: ORTHOPAEDIC SURGERY

## 2025-04-07 PROCEDURE — 64447 NJX AA&/STRD FEMORAL NRV IMG: CPT | Performed by: STUDENT IN AN ORGANIZED HEALTH CARE EDUCATION/TRAINING PROGRAM

## 2025-04-07 PROCEDURE — 7100000001 HC PACU RECOVERY - ADDTL 15 MIN: Performed by: ORTHOPAEDIC SURGERY

## 2025-04-07 PROCEDURE — 7100000011 HC PHASE II RECOVERY - ADDTL 15 MIN: Performed by: ORTHOPAEDIC SURGERY

## 2025-04-07 PROCEDURE — 2720000010 HC SURG SUPPLY STERILE: Performed by: ORTHOPAEDIC SURGERY

## 2025-04-07 PROCEDURE — 2500000003 HC RX 250 WO HCPCS

## 2025-04-07 PROCEDURE — 6360000002 HC RX W HCPCS: Performed by: ANESTHESIOLOGY

## 2025-04-07 PROCEDURE — 3600000019 HC SURGERY ROBOT ADDTL 15MIN: Performed by: ORTHOPAEDIC SURGERY

## 2025-04-07 PROCEDURE — 97116 GAIT TRAINING THERAPY: CPT

## 2025-04-07 PROCEDURE — 6360000002 HC RX W HCPCS

## 2025-04-07 PROCEDURE — 6360000002 HC RX W HCPCS: Performed by: ORTHOPAEDIC SURGERY

## 2025-04-07 PROCEDURE — 97162 PT EVAL MOD COMPLEX 30 MIN: CPT

## 2025-04-07 PROCEDURE — 3600000009 HC SURGERY ROBOT BASE: Performed by: ORTHOPAEDIC SURGERY

## 2025-04-07 RX ORDER — NALOXONE HYDROCHLORIDE 0.4 MG/ML
INJECTION, SOLUTION INTRAMUSCULAR; INTRAVENOUS; SUBCUTANEOUS PRN
Status: DISCONTINUED | OUTPATIENT
Start: 2025-04-07 | End: 2025-04-07 | Stop reason: HOSPADM

## 2025-04-07 RX ORDER — SODIUM CHLORIDE 0.9 % (FLUSH) 0.9 %
5-40 SYRINGE (ML) INJECTION EVERY 12 HOURS SCHEDULED
Status: DISCONTINUED | OUTPATIENT
Start: 2025-04-07 | End: 2025-04-07 | Stop reason: HOSPADM

## 2025-04-07 RX ORDER — OXYCODONE AND ACETAMINOPHEN 5; 325 MG/1; MG/1
2 TABLET ORAL EVERY 4 HOURS PRN
Qty: 50 TABLET | Refills: 0 | Status: SHIPPED | OUTPATIENT
Start: 2025-04-07 | End: 2025-04-14

## 2025-04-07 RX ORDER — SODIUM CHLORIDE 9 MG/ML
INJECTION, SOLUTION INTRAVENOUS PRN
Status: DISCONTINUED | OUTPATIENT
Start: 2025-04-07 | End: 2025-04-07 | Stop reason: HOSPADM

## 2025-04-07 RX ORDER — METHOCARBAMOL 100 MG/ML
500 INJECTION, SOLUTION INTRAMUSCULAR; INTRAVENOUS ONCE
Status: COMPLETED | OUTPATIENT
Start: 2025-04-07 | End: 2025-04-07

## 2025-04-07 RX ORDER — SODIUM CHLORIDE 0.9 % (FLUSH) 0.9 %
5-40 SYRINGE (ML) INJECTION PRN
Status: DISCONTINUED | OUTPATIENT
Start: 2025-04-07 | End: 2025-04-07 | Stop reason: HOSPADM

## 2025-04-07 RX ORDER — CEPHALEXIN 500 MG/1
500 CAPSULE ORAL 3 TIMES DAILY
Qty: 6 CAPSULE | Refills: 0 | Status: SHIPPED | OUTPATIENT
Start: 2025-04-07

## 2025-04-07 RX ORDER — LIDOCAINE HYDROCHLORIDE 10 MG/ML
INJECTION, SOLUTION EPIDURAL; INFILTRATION; INTRACAUDAL; PERINEURAL
Status: DISCONTINUED
Start: 2025-04-07 | End: 2025-04-07 | Stop reason: HOSPADM

## 2025-04-07 RX ORDER — MIDAZOLAM HYDROCHLORIDE 1 MG/ML
INJECTION, SOLUTION INTRAMUSCULAR; INTRAVENOUS
Status: DISCONTINUED | OUTPATIENT
Start: 2025-04-07 | End: 2025-04-07 | Stop reason: SDUPTHER

## 2025-04-07 RX ORDER — ROPIVACAINE HYDROCHLORIDE 5 MG/ML
INJECTION, SOLUTION EPIDURAL; INFILTRATION; PERINEURAL
Status: DISCONTINUED | OUTPATIENT
Start: 2025-04-07 | End: 2025-04-07 | Stop reason: SDUPTHER

## 2025-04-07 RX ORDER — SODIUM CHLORIDE, SODIUM LACTATE, POTASSIUM CHLORIDE, CALCIUM CHLORIDE 600; 310; 30; 20 MG/100ML; MG/100ML; MG/100ML; MG/100ML
INJECTION, SOLUTION INTRAVENOUS
Status: DISCONTINUED | OUTPATIENT
Start: 2025-04-07 | End: 2025-04-07 | Stop reason: SDUPTHER

## 2025-04-07 RX ORDER — GLYCOPYRROLATE 0.2 MG/ML
INJECTION INTRAMUSCULAR; INTRAVENOUS
Status: DISCONTINUED | OUTPATIENT
Start: 2025-04-07 | End: 2025-04-07 | Stop reason: SDUPTHER

## 2025-04-07 RX ORDER — DEXAMETHASONE SODIUM PHOSPHATE 10 MG/ML
INJECTION, SOLUTION INTRAMUSCULAR; INTRAVENOUS
Status: COMPLETED
Start: 2025-04-07 | End: 2025-04-07

## 2025-04-07 RX ORDER — BUPIVACAINE HYDROCHLORIDE 7.5 MG/ML
INJECTION, SOLUTION INTRASPINAL
Status: COMPLETED | OUTPATIENT
Start: 2025-04-07 | End: 2025-04-07

## 2025-04-07 RX ORDER — OXYCODONE HYDROCHLORIDE 5 MG/1
5 TABLET ORAL
Status: DISCONTINUED | OUTPATIENT
Start: 2025-04-07 | End: 2025-04-07 | Stop reason: HOSPADM

## 2025-04-07 RX ORDER — ONDANSETRON 2 MG/ML
INJECTION INTRAMUSCULAR; INTRAVENOUS
Status: DISCONTINUED | OUTPATIENT
Start: 2025-04-07 | End: 2025-04-07 | Stop reason: SDUPTHER

## 2025-04-07 RX ORDER — ACETAMINOPHEN 500 MG
1000 TABLET ORAL ONCE
Status: COMPLETED | OUTPATIENT
Start: 2025-04-07 | End: 2025-04-07

## 2025-04-07 RX ORDER — EPHEDRINE SULFATE 50 MG/ML
INJECTION INTRAVENOUS
Status: DISCONTINUED | OUTPATIENT
Start: 2025-04-07 | End: 2025-04-07 | Stop reason: SDUPTHER

## 2025-04-07 RX ORDER — LABETALOL HYDROCHLORIDE 5 MG/ML
10 INJECTION, SOLUTION INTRAVENOUS
Status: DISCONTINUED | OUTPATIENT
Start: 2025-04-07 | End: 2025-04-07 | Stop reason: HOSPADM

## 2025-04-07 RX ORDER — TRANEXAMIC ACID 10 MG/ML
1000 INJECTION, SOLUTION INTRAVENOUS
Status: COMPLETED | OUTPATIENT
Start: 2025-04-07 | End: 2025-04-07

## 2025-04-07 RX ORDER — MAGNESIUM HYDROXIDE 1200 MG/15ML
LIQUID ORAL CONTINUOUS PRN
Status: COMPLETED | OUTPATIENT
Start: 2025-04-07 | End: 2025-04-07

## 2025-04-07 RX ORDER — SODIUM CHLORIDE, SODIUM LACTATE, POTASSIUM CHLORIDE, CALCIUM CHLORIDE 600; 310; 30; 20 MG/100ML; MG/100ML; MG/100ML; MG/100ML
INJECTION, SOLUTION INTRAVENOUS CONTINUOUS
Status: DISCONTINUED | OUTPATIENT
Start: 2025-04-07 | End: 2025-04-07 | Stop reason: HOSPADM

## 2025-04-07 RX ORDER — LORAZEPAM 2 MG/ML
0.5 INJECTION INTRAMUSCULAR
Status: DISCONTINUED | OUTPATIENT
Start: 2025-04-07 | End: 2025-04-07 | Stop reason: HOSPADM

## 2025-04-07 RX ORDER — FENTANYL CITRATE 50 UG/ML
INJECTION, SOLUTION INTRAMUSCULAR; INTRAVENOUS
Status: COMPLETED | OUTPATIENT
Start: 2025-04-07 | End: 2025-04-07

## 2025-04-07 RX ORDER — ASPIRIN 325 MG
325 TABLET ORAL 2 TIMES DAILY
Qty: 60 TABLET | Refills: 0 | Status: SHIPPED | OUTPATIENT
Start: 2025-04-07

## 2025-04-07 RX ORDER — ALBUMIN HUMAN 50 G/1000ML
SOLUTION INTRAVENOUS
Status: DISCONTINUED | OUTPATIENT
Start: 2025-04-07 | End: 2025-04-07 | Stop reason: SDUPTHER

## 2025-04-07 RX ORDER — DIPHENHYDRAMINE HYDROCHLORIDE 50 MG/ML
12.5 INJECTION, SOLUTION INTRAMUSCULAR; INTRAVENOUS
Status: DISCONTINUED | OUTPATIENT
Start: 2025-04-07 | End: 2025-04-07 | Stop reason: HOSPADM

## 2025-04-07 RX ORDER — METOCLOPRAMIDE HYDROCHLORIDE 5 MG/ML
10 INJECTION INTRAMUSCULAR; INTRAVENOUS
Status: DISCONTINUED | OUTPATIENT
Start: 2025-04-07 | End: 2025-04-07 | Stop reason: HOSPADM

## 2025-04-07 RX ORDER — DEXAMETHASONE SODIUM PHOSPHATE 10 MG/ML
INJECTION, SOLUTION INTRAMUSCULAR; INTRAVENOUS
Status: COMPLETED | OUTPATIENT
Start: 2025-04-07 | End: 2025-04-07

## 2025-04-07 RX ORDER — LIDOCAINE HYDROCHLORIDE 20 MG/ML
INJECTION, SOLUTION EPIDURAL; INFILTRATION; INTRACAUDAL; PERINEURAL
Status: DISCONTINUED | OUTPATIENT
Start: 2025-04-07 | End: 2025-04-07 | Stop reason: SDUPTHER

## 2025-04-07 RX ORDER — ONDANSETRON 2 MG/ML
4 INJECTION INTRAMUSCULAR; INTRAVENOUS
Status: DISCONTINUED | OUTPATIENT
Start: 2025-04-07 | End: 2025-04-07 | Stop reason: HOSPADM

## 2025-04-07 RX ORDER — DOCUSATE SODIUM 100 MG/1
100 CAPSULE, LIQUID FILLED ORAL DAILY PRN
Qty: 30 CAPSULE | Refills: 0 | Status: SHIPPED | OUTPATIENT
Start: 2025-04-07

## 2025-04-07 RX ORDER — ACETAMINOPHEN 325 MG/1
650 TABLET ORAL
Status: DISCONTINUED | OUTPATIENT
Start: 2025-04-07 | End: 2025-04-07 | Stop reason: HOSPADM

## 2025-04-07 RX ORDER — PROPOFOL 10 MG/ML
INJECTION, EMULSION INTRAVENOUS
Status: DISCONTINUED | OUTPATIENT
Start: 2025-04-07 | End: 2025-04-07 | Stop reason: SDUPTHER

## 2025-04-07 RX ADMIN — EPHEDRINE SULFATE 10 MG: 50 INJECTION INTRAVENOUS at 11:14

## 2025-04-07 RX ADMIN — ALBUMIN (HUMAN) 12.5 G: 12.5 INJECTION, SOLUTION INTRAVENOUS at 11:16

## 2025-04-07 RX ADMIN — TRANEXAMIC ACID 1000 MG: 10 INJECTION, SOLUTION INTRAVENOUS at 10:23

## 2025-04-07 RX ADMIN — FENTANYL CITRATE 20 MCG: 50 INJECTION, SOLUTION INTRAMUSCULAR; INTRAVENOUS at 10:22

## 2025-04-07 RX ADMIN — METHOCARBAMOL 500 MG: 100 INJECTION INTRAMUSCULAR; INTRAVENOUS at 13:20

## 2025-04-07 RX ADMIN — EPHEDRINE SULFATE 15 MG: 50 INJECTION INTRAVENOUS at 11:05

## 2025-04-07 RX ADMIN — LIDOCAINE HYDROCHLORIDE 40 MG: 20 INJECTION, SOLUTION EPIDURAL; INFILTRATION; INTRACAUDAL; PERINEURAL at 12:00

## 2025-04-07 RX ADMIN — HYDROMORPHONE HYDROCHLORIDE 0.5 MG: 1 INJECTION, SOLUTION INTRAMUSCULAR; INTRAVENOUS; SUBCUTANEOUS at 13:08

## 2025-04-07 RX ADMIN — MIDAZOLAM 2 MG: 1 INJECTION INTRAMUSCULAR; INTRAVENOUS at 10:00

## 2025-04-07 RX ADMIN — PROPOFOL 80 MCG/KG/MIN: 10 INJECTION, EMULSION INTRAVENOUS at 10:23

## 2025-04-07 RX ADMIN — DEXAMETHASONE SODIUM PHOSPHATE 5 MG: 10 INJECTION, SOLUTION INTRAMUSCULAR; INTRAVENOUS at 10:03

## 2025-04-07 RX ADMIN — SODIUM CHLORIDE, POTASSIUM CHLORIDE, SODIUM LACTATE AND CALCIUM CHLORIDE: 600; 310; 30; 20 INJECTION, SOLUTION INTRAVENOUS at 10:10

## 2025-04-07 RX ADMIN — ONDANSETRON 8 MG: 2 INJECTION INTRAMUSCULAR; INTRAVENOUS at 10:10

## 2025-04-07 RX ADMIN — ALBUMIN (HUMAN) 12.5 G: 12.5 INJECTION, SOLUTION INTRAVENOUS at 10:46

## 2025-04-07 RX ADMIN — BUPIVACAINE HYDROCHLORIDE IN DEXTROSE 13.5 MG: 7.5 INJECTION, SOLUTION SUBARACHNOID at 10:22

## 2025-04-07 RX ADMIN — CEFAZOLIN 2000 MG: 2 INJECTION, POWDER, FOR SOLUTION INTRAMUSCULAR; INTRAVENOUS at 10:25

## 2025-04-07 RX ADMIN — EPHEDRINE SULFATE 25 MG: 50 INJECTION INTRAVENOUS at 10:59

## 2025-04-07 RX ADMIN — SODIUM CHLORIDE, POTASSIUM CHLORIDE, SODIUM LACTATE AND CALCIUM CHLORIDE: 600; 310; 30; 20 INJECTION, SOLUTION INTRAVENOUS at 12:02

## 2025-04-07 RX ADMIN — MIDAZOLAM 2 MG: 1 INJECTION INTRAMUSCULAR; INTRAVENOUS at 10:15

## 2025-04-07 RX ADMIN — ACETAMINOPHEN 1000 MG: 500 TABLET ORAL at 09:25

## 2025-04-07 RX ADMIN — GLYCOPYRROLATE 0.1 MG: 0.2 INJECTION INTRAMUSCULAR; INTRAVENOUS at 10:26

## 2025-04-07 RX ADMIN — ROPIVACAINE HYDROCHLORIDE 20 ML: 5 INJECTION, SOLUTION EPIDURAL; INFILTRATION; PERINEURAL at 10:03

## 2025-04-07 ASSESSMENT — PAIN SCALES - GENERAL
PAINLEVEL_OUTOF10: 7
PAINLEVEL_OUTOF10: 0
PAINLEVEL_OUTOF10: 0
PAINLEVEL_OUTOF10: 3

## 2025-04-07 ASSESSMENT — PAIN DESCRIPTION - ORIENTATION
ORIENTATION: LEFT
ORIENTATION: LEFT

## 2025-04-07 ASSESSMENT — PAIN DESCRIPTION - PAIN TYPE
TYPE: SURGICAL PAIN
TYPE: SURGICAL PAIN

## 2025-04-07 ASSESSMENT — PAIN DESCRIPTION - DESCRIPTORS
DESCRIPTORS: ACHING;SORE
DESCRIPTORS: ACHING
DESCRIPTORS: ACHING;SORE

## 2025-04-07 ASSESSMENT — PAIN DESCRIPTION - LOCATION
LOCATION: KNEE;ANKLE
LOCATION: KNEE

## 2025-04-07 ASSESSMENT — PAIN DESCRIPTION - ONSET
ONSET: ON-GOING
ONSET: ON-GOING

## 2025-04-07 ASSESSMENT — PAIN - FUNCTIONAL ASSESSMENT
PAIN_FUNCTIONAL_ASSESSMENT: PREVENTS OR INTERFERES SOME ACTIVE ACTIVITIES AND ADLS
PAIN_FUNCTIONAL_ASSESSMENT: 0-10
PAIN_FUNCTIONAL_ASSESSMENT: 0-10
PAIN_FUNCTIONAL_ASSESSMENT: PREVENTS OR INTERFERES SOME ACTIVE ACTIVITIES AND ADLS
PAIN_FUNCTIONAL_ASSESSMENT: PREVENTS OR INTERFERES SOME ACTIVE ACTIVITIES AND ADLS

## 2025-04-07 ASSESSMENT — PAIN DESCRIPTION - FREQUENCY
FREQUENCY: CONTINUOUS
FREQUENCY: CONTINUOUS

## 2025-04-07 NOTE — OP NOTE
flexion.    The patella was resurfaced with a cutting guide.  A size 38 asymmetric patella trial component was inserted.  The knee was again taken through range of motion and there was excellent patellofemoral tracking.  Next the size 38 patella was press fit and seated well with excellent stability.    The trial components were removed.  The knee was thoroughly irrigated with the pulse lavage.  The tibia was prepared for press-fit component.  The size 5 tibial component was impacted into place and seated well with excellent stability.  Next the size 5 CR press-fit femoral component was impacted and placed in seated well with excellent stability.      The knee was trialed again and the size 9mm CR articular surface liner provided excellent stability and range of motion with improved alignment of the knee.  The liner was then implanted.    The knee was injected with 30 mL of half percent Marcaine with epinephrine and 30 mg of Toradol at the arthrotomy site and joint capsule.    The tourniquet was deflated and bleeding was well controlled with the Bovie.    The medial parapatellar arthrotomy was closed with a #1 strata fix suture.  Deep subcutaneous layers were closed with buried 2-0 Vicryl.  Skin was then closed with a running 3-0 strata fix subcuticular stitch followed by a Prineo Dermabond dressing.    A sterile bandage was applied with 4 x 8's, ABD, sterile web roll, ice therapy pad and an Ace wrap.  The patient was awakened and taken to PACU in stable condition.  All sponge and needle counts were correct.    The patient will be observed in the recovery area for pain control, physical therapy, and medical monitoring.  The patient will be on chemical DVT prophylaxis and will be weightbearing as tolerated.  If he is able to pass physical therapy he may be discharged home the same day of surgery if his pain is well-controlled    Electronically signed by Emil Kaufman MD on 4/7/2025 at 12:57 PM

## 2025-04-07 NOTE — ANESTHESIA PROCEDURE NOTES
Spinal Block    End time: 4/7/2025 10:22 AM  Reason for block: primary anesthetic  Staffing  Resident/CRNA: Misha Bob APRN - CRNA  Performed by: Misha Bob APRN - CRNA  Authorized by: Rene Russell MD    Spinal Block  Patient position: sitting  Prep: Betadine  Patient monitoring: cardiac monitor, continuous pulse ox, continuous capnometry, frequent blood pressure checks and oxygen  Approach: midline  Location: L3/L4  Guidance: ultrasound guided  Provider prep: mask and sterile gloves  Needle  Needle type: Pencan   Needle gauge: 24 G  Needle length: 5 in  Assessment  Sensory level: T10  Swirl obtained: Yes  CSF: clear  Attempts: 2  Hemodynamics: stable  Preanesthetic Checklist  Completed: patient identified, IV checked, site marked, risks and benefits discussed, surgical/procedural consents, equipment checked, pre-op evaluation, timeout performed, anesthesia consent given, oxygen available, monitors applied/VS acknowledged, fire risk safety assessment completed and verbalized and blood product R/B/A discussed and consented

## 2025-04-07 NOTE — ANESTHESIA POSTPROCEDURE EVALUATION
Department of Anesthesiology  Postprocedure Note    Patient: Olaf Vieyra  MRN: [id number]  YOB: 1965  Date of evaluation: 4/7/2025    Procedure Summary       Date: 04/07/25 Room / Location: [address]    Anesthesia Start: 1010 Anesthesia Stop: 1246    Procedure: KNEE TOTAL ARTHROPLASTY ROBOTIC (Left) Diagnosis:       Osteoarthritis of left knee      Left knee pain      (Osteoarthritis of left knee [M17.12])      (Left knee pain [M25.562])    Surgeons: Emil Kaufman MD Responsible Provider: Rene Russell MD    Anesthesia Type: MAC ASA Status: 2            Anesthesia Type: MAC    Ulda Phase I: Luda Score: 10    Luda Phase II:      Anesthesia Post Evaluation    Patient location during evaluation: PACU  Patient participation: waiting for patient participation  Level of consciousness: awake  Pain score: 0  Airway patency: patent  Nausea & Vomiting: no nausea and no vomiting  Cardiovascular status: hemodynamically stable  Respiratory status: acceptable, spontaneous ventilation and room air  Hydration status: euvolemic  Multimodal analgesia pain management approach  Pain management: adequate    There were no known notable events for this encounter.

## 2025-04-07 NOTE — ANESTHESIA PROCEDURE NOTES
Peripheral Block    Patient location during procedure: holding area  Reason for block: post-op pain management  Start time: 4/7/2025 10:00 AM  End time: 4/7/2025 10:03 AM  Staffing  Performed: resident/CRNA   Resident/CRNA: Misha Bob APRN - CRNA  Performed by: Misha Bob APRN - CRNA  Authorized by: Rene Russell MD    Preanesthetic Checklist  Completed: patient identified, IV checked, site marked, risks and benefits discussed, surgical/procedural consents, equipment checked, pre-op evaluation, timeout performed, anesthesia consent given, oxygen available, monitors applied/VS acknowledged, fire risk safety assessment completed and verbalized and blood product R/B/A discussed and consented  Peripheral Block   Patient position: supine  Prep: ChloraPrep  Provider prep: mask and sterile gloves  Patient monitoring: cardiac monitor, continuous pulse ox, continuous capnometry, frequent blood pressure checks, IV access, oxygen and responsive to questions  Block type: Saphenous  Laterality: left  Injection technique: single-shot  Guidance: ultrasound guided  Local infiltration: ropivacaine  Infiltration strength: 0.5 %  Local infiltration: ropivacaine  Dose: 20 mL    Needle   Needle type: insulated echogenic nerve stimulator needle   Needle gauge: 20 G  Needle localization: anatomical landmarks and ultrasound guidance  Test dose: negative  Assessment   Injection assessment: negative aspiration for heme, no paresthesia on injection, local visualized surrounding nerve on ultrasound and no intravascular symptoms  Slow fractionated injection: yes  Hemodynamics: stable  Outcomes: uncomplicated and patient tolerated procedure well    Medications Administered  dexAMETHasone (DECADRON) (PF) 10 mg/mL injection - Perineural   5 mg - 4/7/2025 10:03:00 AM

## 2025-04-07 NOTE — PROGRESS NOTES
1241 Patient arrives to PACU, placed on cardiac monitor, alarms on.  Patient arrives alert and oriented.  Respirations unlabored.  1 surgical site to L knee, closed with sutures and glue.  Dressed with prineo, gauze, webril, rosy hose, ice man and ace wrap.  No drainage noted.  Patient had spinal done for procedure.  Arrives to PACU with full sensation to trunk and extremities x4.  Able to independently move extremities x4.  Denies pain.  1310 Medicated with 0.5mg Dilaudid per MAR for pain.  1317 Knee XR completed at BS.  1321 Patient given 500mg Robaxin per MAR for pain.  1325 Patient denies needs at this time.  Denies wanting ice or water.  Reports improvement in pain.  Remains resting on cot with respirations unlabored.  1332 Patient leaving PACU with unchanged surgical site.  PMS intact.  Remains alert with respirations unlabored.  
1338: Patient returns to [facility] following procedure, bedside report received from Suzanne RN, VSS, family at bedside, call light in reach, beverage of choice provided.   1400: VSS  1415: patient sitting at bedside with feet dangling, patient tolerating well. Physical therapy (darrick) contacted to evaluate patient.  1425: physical therapy at bedside working with patient  1433: patient up walking with physical therapy in hallway  1515: Discharge instructions reviewed with patient and SPOUSE, both verbalized understanding.   1520: IV removed, Patient dressing, call light in reach  1525: Patient taken via wheelchair to DC to car w family.   
LVM-Notified patient surgery @ [facility] on 4/7/25 @ 1030, arrival 0830. NPO status reviewed.   
Locker 1   Code 1111  
Patient returned call  
within normal limits

## 2025-04-07 NOTE — H&P
Chief Complaint   Patient presents with    Knee Pain       Left          History of Present Illness:                             Olaf Vieyra is a 59 y.o. male who presents today for evaluation of his left knee pain and swelling and stiffness.  Symptoms have been ongoing for several years progressively worsening with severe symptoms extending over the last year.  He has been through extensive treatments in the past along with knee injections and knee arthroscopy.  He is having progressive worsening problems which make it difficult for him to perform his job duties.  He has trouble going up and down stairs getting up from a seated position.  He has constant deep aching pain along the medial joint line which is occasionally severe and debilitating.  He has trouble exercising and has pain on a daily basis which is getting worse progressively.  He is afraid the knee may give out and cause him to fall.     He has been through knee arthroscopy for meniscus tear and was told that time that he had arthritis in the knee.  His knee scope was performed in June 2023.  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  He has been through extensive conservative treatments and feels that these have failed and is now interested in discussing more definitive surgical intervention.           Patient presents to the office with left knee pain. Pt received an injection from Dr. Vallejo about 2 months ago and the pain has returned. He also has had a round of gel injections which are short-lived. Pt stated that he has had a knee arthroscopy that helped with stability but still deals with consistent pain.      Medical History  Patient's medications, allergies, past medical, surgical, social and family histories were reviewed and updated as appropriate.     Past Medical History        Past Medical History:   Diagnosis Date    Hypertension      Stroke (HCC) 2005         Past Surgical History

## 2025-04-07 NOTE — CONSULTS
[facility] ACUTE CARE PHYSICAL THERAPY EVALUATION  Olaf Vieyra, 1965, OR/NONE, 4/7/2025    History  [place]:  The encounter diagnosis was S/P TKR (total knee replacement), left.  Patient  has a past medical history of Hypertension and Stroke (HCC).  Patient  has a past surgical history that includes Knee arthroscopy (Left, 6/15/2023).    Recommendation: Home with OP PT, RW use, and PRN assist    Durable Medical Equipment: Rolling Walker (RN notified). Would also benefit from tub transfer bench    Subjective:  Patient states: \"I don't know the last time I've walked without a limp!\".    Pain:  4/10 pain in LLE at surgical site at rest.    Communication with other providers:  RN approval for PT session, Handoff to RN  Restrictions: general precautions, falls, WBAT LLE    Home Setup/Prior level of function  Social/Functional History  Lives With: Spouse  Type of Home: House  Home Layout: Multi-level, Able to Live on Main level with bedroom/bathroom  Home Access: Stairs to enter without rails  Entrance Stairs - Number of Steps: 2  Bathroom Shower/Tub: Tub/Shower unit  Bathroom Equipment: None  Home Equipment: Rollator  Has the patient had two or more falls in the past year or any fall with injury in the past year?: Yes (falling off steps while carrying items)  Receives Help From: Family  Prior Level of Assist for ADLs: Independent  Prior Level of Assist for Homemaking: Independent  Prior Level of Assist for Ambulation: Independent community ambulator, with or without device  Prior Level of Assist for Transfers: Independent  Active : Yes  Mode of Transportation: Car  Occupation: Full time employment  Type of Occupation: [employer]    Examination of body systems (includes body structures/functions, activity/participation limitations):  Observation:  Seated EOB with wife present upon arrival, agreeable to PT  Vision:  WFL  Hearing:  WFL  Cardiopulmonary:  on room air  Cognition: AxO x4, see

## 2025-04-07 NOTE — DISCHARGE INSTRUCTIONS
You should unwrap the Ace bandage, ice machine pad, and postoperative dressings 24 hours after surgery.  You will pull the compression stocking down to the ankle and remove the padding and gauze dressings.  There will be a surgical tape and glue over the skin incision site.  This is water tight.     You can place a dry gauze dressing over the incision and change as needed. Pull the compression stocking up over the knee and use for the first 3-5 days. This helps circulation and controls swelling.    You may shower and clean the leg as needed beginning 3 days after surgery.      You can then leave the surgical tape open to air after the first week.  The surgical tape and glue usually remains in place for 2 to 3 weeks typically until the follow-up visit.    Use ice as needed to help with pain and swelling.    Practice range of motion exercises multiple times a day as instructed by physical therapy.  You should practice fully straightening and stretching the back of the knee as well as practice fully bending and stretching the front of the knee.    Use a walker for ambulation and transition to a cane as you demonstrate good mechanics at the guidance of physical therapy. Physical therapy should begin the week of surgery.    Take your blood thinner as prescribed to prevent blood clots in the leg.  Please contact the office if there are any signs for DVT such as severe pain or swelling in the calf with discoloration of the leg.    Follow-up in 2-3 weeks for x-rays and wound check.  If there are any concerns for wound healing problems or infection please call the office earlier.

## 2025-04-09 DIAGNOSIS — Z96.652 STATUS POST LEFT KNEE REPLACEMENT: Primary | ICD-10-CM

## 2025-04-10 ENCOUNTER — HOSPITAL ENCOUNTER (OUTPATIENT)
Dept: PHYSICAL THERAPY | Age: 60
Setting detail: THERAPIES SERIES
Discharge: HOME | End: 2025-04-10
Payer: COMMERCIAL

## 2025-04-10 PROCEDURE — 97110 THERAPEUTIC EXERCISES: CPT

## 2025-04-10 PROCEDURE — 97162 PT EVAL MOD COMPLEX 30 MIN: CPT

## 2025-04-10 NOTE — PROGRESS NOTES
AM      I certify that the above patient is under my care and requires the above skilled services. These professional services are to be provided from an established plan, reviewed by me at least every 90 days. These services are related to the diagnosis stated above and are medically necessary.    Date last seen by physician:_________________________________________________    Physician Signature:____________________________________________Date:_______________

## 2025-04-14 ENCOUNTER — HOSPITAL ENCOUNTER (OUTPATIENT)
Dept: PHYSICAL THERAPY | Age: 60
Setting detail: THERAPIES SERIES
Discharge: HOME | End: 2025-04-14
Payer: COMMERCIAL

## 2025-04-14 PROCEDURE — 97110 THERAPEUTIC EXERCISES: CPT

## 2025-04-14 PROCEDURE — 97112 NEUROMUSCULAR REEDUCATION: CPT

## 2025-04-14 NOTE — FLOWSHEET NOTE
Outpatient Physical Therapy  [place]           [] Phone: [phone]   Fax: [phone]  [place]           [x] Phone: [phone]   Fax: [phone]        Physical Therapy Daily Treatment Note  Date:  2025    Patient Name:  Olaf Vieyra    :  1965  MRN: [id number]  Restrictions/Precautions: fall risk   Diagnosis:   Status post left knee replacement [Z96.652]    Date of Injury/Surgery:  chronic.  Pt had a meniscus surgery 1.5 years ago and a L TKA 25.   Treatment Diagnosis:   M62.81 muscle weakness, R26.89 abnormality of gait   Insurance/Certification information:  Medical [place]  Referring Physician:  Emil Kaufman MD     PCP: Sreekanth Jaime MD  Plan of care signed (Y/N):  eval faxed  Outcome Measure: LEFs:  = 15% ability = 85% disability   Visit# / total visits:     Pain level: 6.510   Goals:     Patient goals:  Pt reports he wants to be able to go do what he needs to do and return to work.     Short term goals to be achieved by May 16, 2025:  Short term goal 1: Pt will report compliance with current HEP as prescribed in order to improve ROM and strength.   Short term goal 2: Pt will demonstrate AAROM L knee flexion to 120 degrees in order to improve ROM.   Short term goal 3: Pt will demonstrate a LEFs score of no more than 70% disability in order to improve quality of life.   Short term goal 4: Pt will demonstrate the ability to safely complete at least 2 complete laps in the 2 minute walk test with heel toe gait with a SPC in order to improve functional mobility.   Short term goal 5: Pt will demonstrate the ability to safely ambulate up the steps with a reciprocal gait and 1 HHA in 2/2 trials in order to improve functional mobility    Subjective:   patient reports of 6.5/10 and appears amb w/RW    Any changes in Ambulatory Summary Sheet?  None    Objective:  AROM  LE  Knee:     Left   Knee flexion     AAROM:  100  degrees   Knee extension       0 degrees

## 2025-04-17 ENCOUNTER — HOSPITAL ENCOUNTER (OUTPATIENT)
Dept: PHYSICAL THERAPY | Age: 60
Setting detail: THERAPIES SERIES
Discharge: HOME | End: 2025-04-17
Payer: COMMERCIAL

## 2025-04-17 PROCEDURE — 97110 THERAPEUTIC EXERCISES: CPT

## 2025-04-17 NOTE — FLOWSHEET NOTE
Outpatient Physical Therapy  [place]           [] Phone: [phone]   Fax: [phone]  [place]           [x] Phone: [phone]   Fax: [phone]        Physical Therapy Daily Treatment Note  Date:  2025    Patient Name:  Olaf Vieyra  \"Skip\"  :  1965  MRN: [id number]  Restrictions/Precautions: fall risk   Diagnosis:   Status post left knee replacement [Z96.652]    Date of Injury/Surgery:  chronic.  Pt had a meniscus surgery 1.5 years ago and a L TKA 25.   Treatment Diagnosis:   M62.81 muscle weakness, R26.89 abnormality of gait   Insurance/Certification information:  Medical [place]  Referring Physician:  Emil Kaufman MD     PCP: Sreekanth Jaime MD  Plan of care signed (Y/N):  travon faxed  Outcome Measure: LEFs:  = 15% ability = 85% disability   Visit# / total visits:   3/25  Pain level: 8/10   Goals:     Patient goals:  Pt reports he wants to be able to go do what he needs to do and return to work.     Short term goals to be achieved by May 16, 2025:  Short term goal 1: Pt will report compliance with current HEP as prescribed in order to improve ROM and strength.   Short term goal 2: Pt will demonstrate AAROM L knee flexion to 120 degrees in order to improve ROM.   Short term goal 3: Pt will demonstrate a LEFs score of no more than 70% disability in order to improve quality of life.   Short term goal 4: Pt will demonstrate the ability to safely complete at least 2 complete laps in the 2 minute walk test with heel toe gait with a SPC in order to improve functional mobility.   Short term goal 5: Pt will demonstrate the ability to safely ambulate up the steps with a reciprocal gait and 1 HHA in 2/2 trials in order to improve functional mobility    Subjective:   patient reports of 8/10 and appears amb with SPC. Pt reports he has a lot of tightness and increased pain this am. He demonstrated bruising into shin and reports it feels like he has shin splints.     Any changes in

## 2025-04-21 ENCOUNTER — HOSPITAL ENCOUNTER (OUTPATIENT)
Dept: PHYSICAL THERAPY | Age: 60
Setting detail: THERAPIES SERIES
Discharge: HOME | End: 2025-04-21
Payer: COMMERCIAL

## 2025-04-21 PROCEDURE — 97110 THERAPEUTIC EXERCISES: CPT

## 2025-04-21 NOTE — FLOWSHEET NOTE
Outpatient Physical Therapy  [place]           [] Phone: [phone]   Fax: [phone]  [place]           [x] Phone: [phone]   Fax: [phone]        Physical Therapy Daily Treatment Note  Date:  2025    Patient Name:  Olaf Vieyra  \"Skip\"  :  1965  MRN: [id number]  Restrictions/Precautions: fall risk   Diagnosis:   Status post left knee replacement [Z96.652]    Date of Injury/Surgery:  chronic.  Pt had a meniscus surgery 1.5 years ago and a L TKA 25.   Treatment Diagnosis:   M62.81 muscle weakness, R26.89 abnormality of gait   Insurance/Certification information:  Medical [place]  Referring Physician:  Emil Kaufman MD     PCP: Sreekanth Jaime MD  Plan of care signed (Y/N):  mooseal faxed  Outcome Measure: LEFs:  = 15% ability = 85% disability   Visit# / total visits:     Pain level: /10   Goals:     Patient goals:  Pt reports he wants to be able to go do what he needs to do and return to work.     Short term goals to be achieved by May 16, 2025:  Short term goal 1: Pt will report compliance with current HEP as prescribed in order to improve ROM and strength.   Short term goal 2: Pt will demonstrate AAROM L knee flexion to 120 degrees in order to improve ROM.   Short term goal 3: Pt will demonstrate a LEFs score of no more than 70% disability in order to improve quality of life.   Short term goal 4: Pt will demonstrate the ability to safely complete at least 2 complete laps in the 2 minute walk test with heel toe gait with a SPC in order to improve functional mobility.   Short term goal 5: Pt will demonstrate the ability to safely ambulate up the steps with a reciprocal gait and 1 HHA in 2/2 trials in order to improve functional mobility    Subjective:   patient reports his leg was throbbing when he woke this morning  Any changes in Ambulatory Summary Sheet?  None    Objective:  AROM  LE  Knee:     Left   Knee flexion     AAROM:  98  degrees   Knee extension       0

## 2025-04-24 ENCOUNTER — HOSPITAL ENCOUNTER (OUTPATIENT)
Dept: PHYSICAL THERAPY | Age: 60
Setting detail: THERAPIES SERIES
Discharge: HOME | End: 2025-04-24
Payer: COMMERCIAL

## 2025-04-24 PROCEDURE — 97110 THERAPEUTIC EXERCISES: CPT

## 2025-04-24 NOTE — FLOWSHEET NOTE
degrees          Exercises: (No more than 4 columns)   Exercise/Equipment 4/21/25 4/24/25          WARM UP     NuStep     X10' S12/A12 Lvl 4 X10' S12/A12 Lvl 4   Bike  S8 x5'   TABLE     Heel slides 1x10 5\" w/ strap 1x10 5\" w/ strap   SAQ 2x10 5\" small roll X25 reps 6\" small   Supine heel prop X4' blue foam next   Quad set 1x10 5\" folded towel behind knee.  1x10 5\" folded towel behind knee.                                          STANDING     Heel raises  --   Heel cord stretch Slant board Slant board 1.5'   Knee flexion stretch on step 1x10 5\" folded towel behind knee.  1x10 5\" folded towel behind knee.    hurdles FWD x 3 laps FWD x 5 laps             PROPRIOCEPTION     Rocker board  Sagital x 2'                  MODALITIES                 Other Therapeutic Activities/Education:  Pt educated on PT findings, plan, prognosis, and HEP.     Home Exercise Program:  4/10  - pt to continue current HEP from the hospital. Pt to work on ambulating with decreased WB through UE with step through gait.     Manual Treatments:  none    Modalities:  none    Communication with other providers:  eval faxed    Assessment:  (Response towards treatment session) (Pain Rating)  5/10  pt reported feeling better after therapy.      Plan for Next Session: Continue with above exercises.      Time In / Time Out: 1030/1120    Timed Code/Total Treatment Minutes: 50'/3 therapeutic exercise     Next Progress Note due:  5/16/25    Plan of Care Interventions:  [x] Therapeutic Exercise  [] Modalities:  [x] Therapeutic Activity     [] Ultrasound  [x] Estim  [x] Gait Training      [] Cervical Traction [] Lumbar Traction  [x] Neuromuscular Re-education    [] Cold/hotpack [] Iontophoresis   [x] Instruction in HEP      [] Vasopneumatic   [] Dry Needling    [] Manual Therapy               [] Aquatic Therapy              Electronically signed by:  VLAD Kincaid PT,    4/24/2025, 10:30 AM

## 2025-04-29 ENCOUNTER — HOSPITAL ENCOUNTER (OUTPATIENT)
Dept: PHYSICAL THERAPY | Age: 60
Setting detail: THERAPIES SERIES
Discharge: HOME | End: 2025-04-29
Payer: COMMERCIAL

## 2025-04-29 PROCEDURE — 97110 THERAPEUTIC EXERCISES: CPT

## 2025-04-29 PROCEDURE — 97112 NEUROMUSCULAR REEDUCATION: CPT

## 2025-04-29 PROCEDURE — 97530 THERAPEUTIC ACTIVITIES: CPT

## 2025-04-29 NOTE — FLOWSHEET NOTE
Outpatient Physical Therapy  [place]           [] Phone: [phone]   Fax: [phone]  [place]           [x] Phone: [phone]   Fax: [phone]        Physical Therapy Daily Treatment Note  Date:  2025    Patient Name:  Olaf Vieyra  \"Skip\"  :  1965  MRN: [id number]  Restrictions/Precautions: fall risk   Diagnosis:   Status post left knee replacement [Z96.652]    Date of Injury/Surgery:  chronic.  Pt had a meniscus surgery 1.5 years ago and a L TKA 25.   Treatment Diagnosis:   M62.81 muscle weakness, R26.89 abnormality of gait   Insurance/Certification information:  Medical [place]  Referring Physician:  Emil Kaufman MD     PCP: Sreekanth Jaime MD  Plan of care signed (Y/N):  eval faxed  Outcome Measure: LEFs:  = 15% ability = 85% disability   Visit# / total visits:     Pain level: 4.5/10   Goals:     Patient goals:  Pt reports he wants to be able to go do what he needs to do and return to work.     Short term goals to be achieved by May 16, 2025:  Short term goal 1: Pt will report compliance with current HEP as prescribed in order to improve ROM and strength.   Short term goal 2: Pt will demonstrate AAROM L knee flexion to 120 degrees in order to improve ROM.   Short term goal 3: Pt will demonstrate a LEFs score of no more than 70% disability in order to improve quality of life.   Short term goal 4: Pt will demonstrate the ability to safely complete at least 2 complete laps in the 2 minute walk test with heel toe gait with a SPC in order to improve functional mobility.   Short term goal 5: Pt will demonstrate the ability to safely ambulate up the steps with a reciprocal gait and 1 HHA in 2/2 trials in order to improve functional mobility    Subjective:   Patient reports of 4.5/10 pain upon arrival and appears amb Indep      Any changes in Ambulatory Summary Sheet?  None    Objective:  AROM  LE  Knee:     Left   Knee flexion     AAROM:  110  degrees   Knee extension

## 2025-04-30 ENCOUNTER — OFFICE VISIT (OUTPATIENT)
Dept: ORTHOPEDIC SURGERY | Age: 60
End: 2025-04-30

## 2025-04-30 VITALS — HEIGHT: 71 IN | OXYGEN SATURATION: 96 % | BODY MASS INDEX: 36.4 KG/M2 | HEART RATE: 69 BPM

## 2025-04-30 DIAGNOSIS — Z96.652 STATUS POST TOTAL LEFT KNEE REPLACEMENT: Primary | ICD-10-CM

## 2025-04-30 PROCEDURE — 99024 POSTOP FOLLOW-UP VISIT: CPT

## 2025-04-30 NOTE — PROGRESS NOTES
Pt returns to the office for 3 week post op. LT TKA: DOS 4/7/25.  Pt states he still has some bruising in his left knee and all over his leg, but doing well. Pt states he is in physical therapy twice a week and each visit they progress a little more. Pt states he still cannot go up and down stairs normal but is working on it. Pt states he is taking OTC pain medication to help minimize his pain. Pt has no new concerns at this time.

## 2025-04-30 NOTE — PATIENT INSTRUCTIONS
Continue weight-bearing as tolerated.  Continue range of motion exercises as instructed.  Ice and elevate as needed.  Tylenol or Motrin for pain.  Follow up in 3 weeks as scheduled

## 2025-05-05 ENCOUNTER — HOSPITAL ENCOUNTER (OUTPATIENT)
Dept: PHYSICAL THERAPY | Age: 60
Setting detail: THERAPIES SERIES
Discharge: HOME | End: 2025-05-05
Payer: COMMERCIAL

## 2025-05-05 PROCEDURE — 97112 NEUROMUSCULAR REEDUCATION: CPT

## 2025-05-05 PROCEDURE — 97110 THERAPEUTIC EXERCISES: CPT

## 2025-05-05 PROCEDURE — 97530 THERAPEUTIC ACTIVITIES: CPT

## 2025-05-05 NOTE — FLOWSHEET NOTE
Outpatient Physical Therapy  [place]           [] Phone: [phone]   Fax: [phone]  [place]           [x] Phone: [phone]   Fax: [phone]        Physical Therapy Daily Treatment Note  Date:  2025    Patient Name:  Olaf Vieyra  \"Skip\"  :  1965  MRN: [id number]  Restrictions/Precautions: fall risk   Diagnosis:   Status post left knee replacement [Z96.652]    Date of Injury/Surgery:  chronic.  Pt had a meniscus surgery 1.5 years ago and a L TKA 25.   Treatment Diagnosis:   M62.81 muscle weakness, R26.89 abnormality of gait   Insurance/Certification information:  Medical [place]  Referring Physician:  Emil Kaufman MD     PCP: Sreekanth Jaime MD  Plan of care signed (Y/N):  travon faxed  Outcome Measure: LEFs:  = 15% ability = 85% disability   Visit# / total visits:     Pain level: 4.5/10   Goals:     Patient goals:  Pt reports he wants to be able to go do what he needs to do and return to work.     Short term goals to be achieved by May 16, 2025:  Short term goal 1: Pt will report compliance with current HEP as prescribed in order to improve ROM and strength.   Short term goal 2: Pt will demonstrate AAROM L knee flexion to 120 degrees in order to improve ROM.   Short term goal 3: Pt will demonstrate a LEFs score of no more than 70% disability in order to improve quality of life.   Short term goal 4: Pt will demonstrate the ability to safely complete at least 2 complete laps in the 2 minute walk test with heel toe gait with a SPC in order to improve functional mobility.   Short term goal 5: Pt will demonstrate the ability to safely ambulate up the steps with a reciprocal gait and 1 HHA in 2/2 trials in order to improve functional mobility    Subjective:   Patient rates his left knee pain 4.5/10 today.  Has some sciatic pain this morning. Bruising is going down.        Any changes in Ambulatory Summary Sheet?  None    Objective:  AROM  LE  Knee:     Left   Knee flexion

## 2025-05-07 NOTE — PROGRESS NOTES
4/30/2025   Chief Complaint   Patient presents with    Post-Op Check     3 week post op LT TKA. DPS: 4/7/25        History of Present Illness:                             Olaf Vieyra is a 59 y.o. male returning to the office today for continued postoperative care regarding left total knee arthroplasty, DOS 4 7.  He has been working with physical.  He denies any signs of infection from his incision area.  He notes some residual bruising after the surgery but states it is improving.    Pt returns to the office for 3 week post op. LT TKA: DOS 4/7/25.  Pt states he still has some bruising in his left knee and all over his leg, but doing well. Pt states he is in physical therapy twice a week and each visit they progress a little more. Pt states he still cannot go up and down stairs normal but is working on it. Pt states he is taking OTC pain medication to help minimize his pain. Pt has no new concerns at this time.       Medical History  Patient's medications, allergies, past medical, surgical, social and family histories were reviewed and updated as appropriate.      Review of Systems                                            Examination:  General Exam:  Vitals: Pulse 69   Ht 1.803 m (5' 11\")   SpO2 96%   BMI 36.40 kg/m²    Physical Exam     Left knee exam: Patient left knee appears with a well-approximated anterior incision that shows no signs of infection such as erythema, fluctuance, drainage, or surrounding temperature changes.  Patient able to reach full extension and 100 degrees of flexion during active and passive maneuvers.  No joint laxity.  Popliteal pulse 2+ sign negative.  Sensation to light touch intact surfaces of the left lower extremity.    Diagnostic testing:  X-rays reviewed in office, I independently reviewed the films in the office today:     Imaging results from today's visit:  Impression: Left total knee replacement stable     Office Procedures:  No orders of the defined types were

## 2025-05-08 ENCOUNTER — HOSPITAL ENCOUNTER (OUTPATIENT)
Dept: PHYSICAL THERAPY | Age: 60
Setting detail: THERAPIES SERIES
Discharge: HOME | End: 2025-05-08
Payer: COMMERCIAL

## 2025-05-08 PROCEDURE — 97112 NEUROMUSCULAR REEDUCATION: CPT

## 2025-05-08 PROCEDURE — 97530 THERAPEUTIC ACTIVITIES: CPT

## 2025-05-08 PROCEDURE — 97110 THERAPEUTIC EXERCISES: CPT

## 2025-05-08 NOTE — FLOWSHEET NOTE
Outpatient Physical Therapy  [place]           [] Phone: [phone]   Fax: [phone]  [place]           [x] Phone: [phone]   Fax: [phone]        Physical Therapy Daily Treatment Note  Date:  2025    Patient Name:  Olaf Vieyra  \"Skip\"  :  1965  MRN: [id number]  Restrictions/Precautions: fall risk   Diagnosis:   Status post left knee replacement [Z96.652]    Date of Injury/Surgery:  chronic.  Pt had a meniscus surgery 1.5 years ago and a L TKA 25.   Treatment Diagnosis:   M62.81 muscle weakness, R26.89 abnormality of gait   Insurance/Certification information:  Medical [place]  Referring Physician:  Emil Kaufman MD     PCP: Sreekanth Jaime MD  Plan of care signed (Y/N):  travon faxed  Outcome Measure: LEFs:  = 15% ability = 85% disability   Visit# / total visits:     Pain level: 4/10   Goals:     Patient goals:  Pt reports he wants to be able to go do what he needs to do and return to work.     Short term goals to be achieved by May 16, 2025:  Short term goal 1: Pt will report compliance with current HEP as prescribed in order to improve ROM and strength.   Short term goal 2: Pt will demonstrate AAROM L knee flexion to 120 degrees in order to improve ROM.   Short term goal 3: Pt will demonstrate a LEFs score of no more than 70% disability in order to improve quality of life.   Short term goal 4: Pt will demonstrate the ability to safely complete at least 2 complete laps in the 2 minute walk test with heel toe gait with a SPC in order to improve functional mobility.   Short term goal 5: Pt will demonstrate the ability to safely ambulate up the steps with a reciprocal gait and 1 HHA in 2/2 trials in order to improve functional mobility    Subjective:   Patient rates his left knee pain 4/10 today.  Has some sciatic pain this morning. Bruising is going down.        Any changes in Ambulatory Summary Sheet?  None    Objective:  AROM  LE  Knee:     Left   Knee flexion

## 2025-05-13 ENCOUNTER — HOSPITAL ENCOUNTER (OUTPATIENT)
Dept: PHYSICAL THERAPY | Age: 60
Discharge: HOME | End: 2025-05-13

## 2025-05-13 NOTE — FLOWSHEET NOTE
Physical Therapy  Cancellation/No-show Note  Patient Name:  Olaf Vieyra  :  1965   Date:  2025  Cancelled visits to date: 1  No-shows to date: 0    For today's appointment patient:  [x]  Cancelled  []  Rescheduled appointment  []  No-show     Reason given by patient:  []  Patient ill  []  Conflicting appointment  []  No transportation    []  Conflict with work  []  No reason given  [x]  Other:  Has things to do   Comments:      Electronically signed by:  Cris Leslie, PTA

## 2025-05-16 ENCOUNTER — HOSPITAL ENCOUNTER (OUTPATIENT)
Dept: PHYSICAL THERAPY | Age: 60
Discharge: HOME | End: 2025-05-16

## 2025-05-16 NOTE — FLOWSHEET NOTE
Physical Therapy  Cancellation/No-show Note  Patient Name:  Olaf Vieyra  :  1965   Date:  2025  Cancelled visits to date: 0  No-shows to date: 0    For today's appointment patient:  [x]  Cancelled  []  Rescheduled appointment  []  No-show     Reason given by patient:  []  Patient ill  []  Conflicting appointment  []  No transportation    []  Conflict with work  []  No reason given  [x]  Other:  Pt reports he fell on his knee and it is sore.   Comments:      Electronically signed by:  Leanne Dave PT,DPT [id number]  2025, 10:08 AM

## 2025-05-20 ENCOUNTER — OFFICE VISIT (OUTPATIENT)
Dept: ORTHOPEDIC SURGERY | Age: 60
End: 2025-05-20

## 2025-05-20 ENCOUNTER — HOSPITAL ENCOUNTER (OUTPATIENT)
Dept: PHYSICAL THERAPY | Age: 60
Discharge: HOME | End: 2025-05-20

## 2025-05-20 VITALS — BODY MASS INDEX: 36.4 KG/M2 | HEART RATE: 87 BPM | RESPIRATION RATE: 13 BRPM | OXYGEN SATURATION: 97 % | HEIGHT: 71 IN

## 2025-05-20 DIAGNOSIS — Z96.652 STATUS POST TOTAL LEFT KNEE REPLACEMENT: Primary | ICD-10-CM

## 2025-05-20 PROCEDURE — 99024 POSTOP FOLLOW-UP VISIT: CPT | Performed by: ORTHOPAEDIC SURGERY

## 2025-05-20 RX ORDER — NAPROXEN 500 MG/1
500 TABLET ORAL 2 TIMES DAILY WITH MEALS
COMMUNITY
Start: 2025-02-26

## 2025-05-20 NOTE — FLOWSHEET NOTE
Physical Therapy  Cancellation/No-show Note  Patient Name:  Olaf Vieyra  :  1965   Date:  2025  Cancelled visits to date: 3  No-shows to date: 0    For today's appointment patient:  [x]  Cancelled  []  Rescheduled appointment  []  No-show     Reason given by patient:  []  Patient ill  []  Conflicting appointment  []  No transportation    []  Conflict with work  [x]  No reason given  []  Other:     Comments:      Electronically signed by:  Trudy More PTA  2025, 10:52 AM

## 2025-05-20 NOTE — PROGRESS NOTES
5/20/2025   Chief Complaint   Patient presents with    Knee Pain     Left TKA DOS: 4/7/25        History of Present Illness:                             Olaf Vieyra is a 59 y.o. male returns today for follow-up of his left total knee replacement.  He has been doing well advancing his activity with range of motion and strengthening at the guidance of therapy.  He did have a misstep and a fall on 5/15/2025 when he landed forcefully onto the anterior aspect of his knee.  He had some swelling and bruising but has been able to maintain his ambulation activities weightbearing and therapy exercises    Patient returns to the office with a 6 week post op of his left TKA. Pt stated he has been doing therapy and working on gaining strength for his job that requires excess stairs. Pt stated that he fell on 5/15 directly onto the knee which has swelling and bruising after. Pt stated he has seen improvement since then but does have aching in his knee today       Medical History  Patient's medications, allergies, past medical, surgical, social and family histories were reviewed and updated as appropriate.      Review of Systems                                            Examination:  General Exam:  Vitals: Pulse 87   Resp 13   Ht 1.803 m (5' 11\")   SpO2 97%   BMI 36.40 kg/m²    Physical Exam     Left Lower Extremity:    The incision is well-healed.  No erythema, drainage, or induration.  Minimal resolving soft tissue swelling present throughout the soft tissues of the knee.  Range of motion is present from full extension to 120 degrees of flexion.  Calf is soft and nontender.  Negative Homans sign.  Sensation and motor function is intact at the knee and ankle.      Diagnostic testing:  X-rays reviewed in office, I independently reviewed the films in the office today:     XR KNEE LEFT (3 VIEWS)  Result Date: 5/20/2025  3 views of the left knee show well aligned press-fit total knee replacement.  No evidence of fracture

## 2025-05-20 NOTE — PROGRESS NOTES
Patient returns to the office with a 6 week post op of his left TKA. Pt stated he has been doing therapy and working on gaining strength for his job that requires excess stairs. Pt stated that he fell on 5/15 directly onto the knee which has swelling and bruising after. Pt stated he has seen improvement since then but does have aching in his knee today.

## 2025-05-23 ENCOUNTER — HOSPITAL ENCOUNTER (OUTPATIENT)
Dept: PHYSICAL THERAPY | Age: 60
Setting detail: THERAPIES SERIES
Discharge: HOME | End: 2025-05-23
Payer: COMMERCIAL

## 2025-05-23 PROCEDURE — 97110 THERAPEUTIC EXERCISES: CPT

## 2025-05-23 PROCEDURE — 97530 THERAPEUTIC ACTIVITIES: CPT

## 2025-05-23 NOTE — FLOWSHEET NOTE
Outpatient Physical Therapy  [place]           [] Phone: [phone]   Fax: [phone]  [place]           [x] Phone: [phone]   Fax: [phone]        Physical Therapy Daily Treatment Note  Date:  2025    Patient Name:  Olaf Vieyra  \"Skip\"  :  1965  MRN: [id number]  Restrictions/Precautions: fall risk   Diagnosis:   Status post left knee replacement [Z96.652]    Date of Injury/Surgery:  chronic.  Pt had a meniscus surgery 1.5 years ago and a L TKA 25.   Treatment Diagnosis:   M62.81 muscle weakness, R26.89 abnormality of gait   Insurance/Certification information:  Medical [place]  Referring Physician:  Emil Kaufman MD     PCP: Sreekanth Jaime MD  Plan of care signed (Y/N):  mooseal faxed  Outcome Measure: LEFs:  = 15% ability = 85% disability   Visit# / total visits:     Pain level: 5/10   Goals:     Patient goals:  Pt reports he wants to be able to go do what he needs to do and return to work.     Short term goals to be achieved by May 16, 2025:  Short term goal 1: Pt will report compliance with current HEP as prescribed in order to improve ROM and strength.   Short term goal 2: Pt will demonstrate AAROM L knee flexion to 120 degrees in order to improve ROM.   Short term goal 3: Pt will demonstrate a LEFs score of no more than 70% disability in order to improve quality of life.   Short term goal 4: Pt will demonstrate the ability to safely complete at least 2 complete laps in the 2 minute walk test with heel toe gait with a SPC in order to improve functional mobility.   Short term goal 5: Pt will demonstrate the ability to safely ambulate up the steps with a reciprocal gait and 1 HHA in 2/2 trials in order to improve functional mobility    Subjective:   Patient reports he tripped and fell on the knee had an x-ray and everything is fine it's just bruised, he rates his pain at a 5/10.      Any changes in Ambulatory Summary Sheet?  None    Objective:  AROM  LE  Knee:      27-Mar-2023 22:38

## 2025-05-28 ENCOUNTER — HOSPITAL ENCOUNTER (OUTPATIENT)
Dept: PHYSICAL THERAPY | Age: 60
Setting detail: THERAPIES SERIES
Discharge: HOME | End: 2025-05-28
Payer: COMMERCIAL

## 2025-05-28 PROCEDURE — 97110 THERAPEUTIC EXERCISES: CPT

## 2025-05-28 PROCEDURE — 97112 NEUROMUSCULAR REEDUCATION: CPT

## 2025-05-28 NOTE — FLOWSHEET NOTE
Outpatient Physical Therapy  [place]           [] Phone: [phone]   Fax: [phone]  [place]           [x] Phone: [phone]   Fax: [phone]        Physical Therapy Daily Treatment Note  Date:  2025    Patient Name:  Olaf Vieyra  \"Skip\"  :  1965  MRN: [id number]  Restrictions/Precautions: fall risk   Diagnosis:   Status post left knee replacement [Z96.652]    Date of Injury/Surgery:  chronic.  Pt had a meniscus surgery 1.5 years ago and a L TKA 25.   Treatment Diagnosis:   M62.81 muscle weakness, R26.89 abnormality of gait   Insurance/Certification information:  Medical [place]  Referring Physician:  Emil Kaufman MD     PCP: Sreekanth Jaime MD  Plan of care signed (Y/N):  travon faxed  Outcome Measure: LEFs:  = 15% ability = 85% disability   Visit# / total visits:   10/25  Pain level: 4.5/10   Goals:     Patient goals:  Pt reports he wants to be able to go do what he needs to do and return to work.     Short term goals to be achieved by May 16, 2025:  Short term goal 1: Pt will report compliance with current HEP as prescribed in order to improve ROM and strength.   Short term goal 2: Pt will demonstrate AAROM L knee flexion to 120 degrees in order to improve ROM.   Short term goal 3: Pt will demonstrate a LEFs score of no more than 70% disability in order to improve quality of life.   Short term goal 4: Pt will demonstrate the ability to safely complete at least 2 complete laps in the 2 minute walk test with heel toe gait with a SPC in order to improve functional mobility.   Short term goal 5: Pt will demonstrate the ability to safely ambulate up the steps with a reciprocal gait and 1 HHA in 2/2 trials in order to improve functional mobility    Subjective:   Patient reports he is frustrated and feels like his strength has reached a plateau. He reports he especially has trouble on the steps and he has to go up and down steps every 1/2 hour at work. He reports he continues

## 2025-05-30 ENCOUNTER — HOSPITAL ENCOUNTER (OUTPATIENT)
Dept: PHYSICAL THERAPY | Age: 60
Discharge: HOME | End: 2025-05-30

## 2025-05-30 NOTE — FLOWSHEET NOTE
Physical Therapy  Cancellation/No-show Note  Patient Name:  Olaf Vieyra  :  1965   Date:  2025  Cancelled visits to date: 4  No-shows to date: 0    For today's appointment patient:  [x]  Cancelled  []  Rescheduled appointment  []  No-show     Reason given by patient:  []  Patient ill  []  Conflicting appointment  []  No transportation    []  Conflict with work  [x]  No reason given  []  Other:     Comments:      Electronically signed by:  Latrice Subramanian PTA,   2025, 10:55 AM

## 2025-06-03 ENCOUNTER — HOSPITAL ENCOUNTER (OUTPATIENT)
Dept: PHYSICAL THERAPY | Age: 60
Setting detail: THERAPIES SERIES
Discharge: HOME | End: 2025-06-03
Payer: COMMERCIAL

## 2025-06-03 PROCEDURE — 97530 THERAPEUTIC ACTIVITIES: CPT

## 2025-06-03 PROCEDURE — 97112 NEUROMUSCULAR REEDUCATION: CPT

## 2025-06-03 PROCEDURE — 97110 THERAPEUTIC EXERCISES: CPT

## 2025-06-03 NOTE — FLOWSHEET NOTE
Outpatient Physical Therapy  [place]           [] Phone: [phone]   Fax: [phone]  [facility]           [x] Phone: [phone]   Fax: [phone]        Physical Therapy Daily Treatment Note  Date:  6/3/2025    Patient Name:  Olaf Vieyra  \"Skip\"  :  1965  MRN: [id number]  Restrictions/Precautions: fall risk   Diagnosis:   Status post left knee replacement [Z96.652]    Date of Injury/Surgery:  chronic.  Pt had a meniscus surgery 1.5 years ago and a L TKA 25.   Treatment Diagnosis:   M62.81 muscle weakness, R26.89 abnormality of gait   Insurance/Certification information:  Medical [place]  Referring Physician:  Emil Kaufman MD     PCP: Sreekanth Jaime MD  Plan of care signed (Y/N):  mooseal faxed  Outcome Measure: LEFs:  = 15% ability = 85% disability   Visit# / total visits:     Pain level: 2.5/10   Goals:     Patient goals:  Pt reports he wants to be able to go do what he needs to do and return to work.     Short term goals to be achieved by May 16, 2025:  Short term goal 1: Pt will report compliance with current HEP as prescribed in order to improve ROM and strength.   Short term goal 2: Pt will demonstrate AAROM L knee flexion to 120 degrees in order to improve ROM.   Short term goal 3: Pt will demonstrate a LEFs score of no more than 70% disability in order to improve quality of life.   Short term goal 4: Pt will demonstrate the ability to safely complete at least 2 complete laps in the 2 minute walk test with heel toe gait with a SPC in order to improve functional mobility.   Short term goal 5: Pt will demonstrate the ability to safely ambulate up the steps with a reciprocal gait and 1 HHA in 2/2 trials in order to improve functional mobility    Subjective:   Patient reports of 2.5/10 pain upon arrival and feels he's doing a little bit better with stairs      Any changes in Ambulatory Summary Sheet?  None    Objective:  2MWT 384ft  AROM  LE  Knee:     Left   Knee flexion

## 2025-06-06 ENCOUNTER — HOSPITAL ENCOUNTER (OUTPATIENT)
Dept: PHYSICAL THERAPY | Age: 60
Discharge: HOME | End: 2025-06-06

## 2025-06-06 NOTE — FLOWSHEET NOTE
Physical Therapy  Cancellation/No-show Note  Patient Name:  Olaf Vieyra  :  1965   Date:  2025  Cancelled visits to date: 5  No-shows to date: 0    For today's appointment patient:  [x]  Cancelled  []  Rescheduled appointment  []  No-show     Reason given by patient:  []  Patient ill  []  Conflicting appointment  []  No transportation    []  Conflict with work  [x]  No reason given  []  Other:     Comments:      Electronically signed by:  Trudy More PTA   2025, 11:34 AM

## 2025-06-13 ENCOUNTER — HOSPITAL ENCOUNTER (OUTPATIENT)
Dept: PHYSICAL THERAPY | Age: 60
Setting detail: THERAPIES SERIES
Discharge: HOME | End: 2025-06-13
Payer: COMMERCIAL

## 2025-06-13 PROCEDURE — 97112 NEUROMUSCULAR REEDUCATION: CPT

## 2025-06-13 PROCEDURE — 97110 THERAPEUTIC EXERCISES: CPT

## 2025-06-13 PROCEDURE — 97530 THERAPEUTIC ACTIVITIES: CPT

## 2025-06-13 NOTE — FLOWSHEET NOTE
Outpatient Physical Therapy  [place]           [] Phone: [phone]   Fax: [phone]  [place]           [x] Phone: [phone]   Fax: [phone]        Physical Therapy Daily Treatment Note  Date:  2025    Patient Name:  Olaf Vieyra  \"Skip\"  :  1965  MRN: [id number]  Restrictions/Precautions: fall risk   Diagnosis:   Status post left knee replacement [Z96.652]    Date of Injury/Surgery:  chronic.  Pt had a meniscus surgery 1.5 years ago and a L TKA 25.   Treatment Diagnosis:   M62.81 muscle weakness, R26.89 abnormality of gait   Insurance/Certification information:  Medical [place]  Referring Physician:  Emil Kaufman MD     PCP: Sreekanth Jaime MD  Plan of care signed (Y/N):  travon faxed  Outcome Measure: LEFs:  = 15% ability = 85% disability   Visit# / total visits:     Pain level: 4/10   Goals:     Patient goals:  Pt reports he wants to be able to go do what he needs to do and return to work.     Short term goals to be achieved by May 16, 2025:  Short term goal 1: Pt will report compliance with current HEP as prescribed in order to improve ROM and strength.   Short term goal 2: Pt will demonstrate AAROM L knee flexion to 120 degrees in order to improve ROM.   Short term goal 3: Pt will demonstrate a LEFs score of no more than 70% disability in order to improve quality of life.   Short term goal 4: Pt will demonstrate the ability to safely complete at least 2 complete laps in the 2 minute walk test with heel toe gait with a SPC in order to improve functional mobility.   Short term goal 5: Pt will demonstrate the ability to safely ambulate up the steps with a reciprocal gait and 1 HHA in 2/2 trials in order to improve functional mobility    Subjective:   Patient reports of 4/10 pain upon arrival and has been doing steps more and more at home but still doesn't feel he has the strength that he needs to do the stairs at work.      Any changes in Ambulatory Summary Sheet?

## 2025-06-18 ENCOUNTER — HOSPITAL ENCOUNTER (OUTPATIENT)
Dept: PHYSICAL THERAPY | Age: 60
Discharge: HOME | End: 2025-06-18

## 2025-06-18 NOTE — FLOWSHEET NOTE
Physical Therapy  Cancellation/No-show Note  Patient Name:  Olaf Vieyra  :  1965   Date:  2025  Cancelled visits to date: 6  No-shows to date: 0    For today's appointment patient:  [x]  Cancelled  []  Rescheduled appointment  []  No-show     Reason given by patient:  []  Patient ill  []  Conflicting appointment  []  No transportation    []  Conflict with work  [x]  No reason given  []  Other:     Comments:      Electronically signed by:  Cris Leslie PTA   2025, 10:01 AM

## 2025-06-20 ENCOUNTER — HOSPITAL ENCOUNTER (OUTPATIENT)
Dept: PHYSICAL THERAPY | Age: 60
Setting detail: THERAPIES SERIES
Discharge: HOME | End: 2025-06-20
Payer: COMMERCIAL

## 2025-06-20 PROCEDURE — 97530 THERAPEUTIC ACTIVITIES: CPT

## 2025-06-20 PROCEDURE — 97112 NEUROMUSCULAR REEDUCATION: CPT

## 2025-06-20 PROCEDURE — 97110 THERAPEUTIC EXERCISES: CPT

## 2025-06-20 NOTE — FLOWSHEET NOTE
Doing well with all exercises.         Plan for Next Session: Continue with above exercises.      Time In / Time Out:   8640 - 0623    Timed Code/Total Treatment Minutes:   38  1te 1ta 1nr     Next Progress Note due:  5/16/25    Plan of Care Interventions:  [x] Therapeutic Exercise  [] Modalities:  [x] Therapeutic Activity     [] Ultrasound  [x] Estim  [x] Gait Training      [] Cervical Traction [] Lumbar Traction  [x] Neuromuscular Re-education    [] Cold/hotpack [] Iontophoresis   [x] Instruction in HEP      [] Vasopneumatic   [] Dry Needling    [] Manual Therapy               [] Aquatic Therapy              Electronically signed by:  Cris Leslie PTA    6/20/2025, 9:28 AM

## 2025-06-24 ENCOUNTER — HOSPITAL ENCOUNTER (OUTPATIENT)
Dept: PHYSICAL THERAPY | Age: 60
Discharge: HOME | End: 2025-06-24

## 2025-06-24 NOTE — FLOWSHEET NOTE
Physical Therapy  Cancellation/No-show Note  Patient Name:  Olaf Vieyra  :  1965   Date:  2025  Cancelled visits to date: 7  No-shows to date: 0    For today's appointment patient:  [x]  Cancelled  []  Rescheduled appointment  []  No-show     Reason given by patient:  []  Patient ill  []  Conflicting appointment  []  No transportation    []  Conflict with work  []  No reason given  [x]  Other:  Had to go out of town   Comments:      Electronically signed by:  Cris Leslie PTA   2025, 11:19 AM

## 2025-06-25 ENCOUNTER — HOSPITAL ENCOUNTER (OUTPATIENT)
Dept: PHYSICAL THERAPY | Age: 60
Setting detail: THERAPIES SERIES
Discharge: HOME | End: 2025-06-25
Payer: COMMERCIAL

## 2025-06-25 PROCEDURE — 97110 THERAPEUTIC EXERCISES: CPT

## 2025-06-25 PROCEDURE — 97530 THERAPEUTIC ACTIVITIES: CPT

## 2025-06-25 NOTE — FLOWSHEET NOTE
Feels like he really had a workout today     Plan for Next Session: Continue with above exercises.      Time In / Time Out:   1302 - 1340     Timed Code/Total Treatment Minutes:  38   2te 1ta    Next Progress Note due:  5/16/25    Plan of Care Interventions:  [x] Therapeutic Exercise  [] Modalities:  [x] Therapeutic Activity     [] Ultrasound  [x] Estim  [x] Gait Training      [] Cervical Traction [] Lumbar Traction  [x] Neuromuscular Re-education    [] Cold/hotpack [] Iontophoresis   [x] Instruction in HEP      [] Vasopneumatic   [] Dry Needling    [] Manual Therapy               [] Aquatic Therapy              Electronically signed by:  Cris Leslie PTA    6/25/2025, 1:02 PM

## 2025-07-01 ENCOUNTER — OFFICE VISIT (OUTPATIENT)
Dept: ORTHOPEDIC SURGERY | Age: 60
End: 2025-07-01

## 2025-07-01 ENCOUNTER — TELEPHONE (OUTPATIENT)
Dept: ORTHOPEDIC SURGERY | Age: 60
End: 2025-07-01

## 2025-07-01 VITALS — HEIGHT: 71 IN | BODY MASS INDEX: 36.54 KG/M2 | WEIGHT: 261 LBS

## 2025-07-01 DIAGNOSIS — Z96.652 STATUS POST TOTAL LEFT KNEE REPLACEMENT: Primary | ICD-10-CM

## 2025-07-01 PROCEDURE — 99024 POSTOP FOLLOW-UP VISIT: CPT

## 2025-07-01 NOTE — TELEPHONE ENCOUNTER
Remain off work for 4 more weeks,   Two weeks on light duty work  Follow up in 6 weeks to check in on light duty status, and discuss returning back to work with no restrictions.

## 2025-07-01 NOTE — PROGRESS NOTES
Pt returns to the office for 12 week post op LT TKA, DOS; 4/7/25.  Pt states he is doing well and still in physical therapy. Pt states he has 10 sessions left. Pt states he has no pain. Pt states he is currently working on strengthening his left knee in therapy and is requesting more time off work due to his job. Pt has no new concerns at this time.

## 2025-07-01 NOTE — PATIENT INSTRUCTIONS
Continue weight-bearing as tolerated.  Continue range of motion exercises as instructed.  Ice and elevate as needed.  Tylenol or Motrin for pain.  Continue with therapy  Remain off work for 4 week  Two weeks on light duty work  Follow up in 6 weeks to check in on light duty status, and discuss returning back to work with no restrictions

## 2025-07-09 NOTE — PROGRESS NOTES
7/1/2025   Chief Complaint   Patient presents with    Post-Op Check     12 week post op LT TKA, DOS: 4/7/25        History of Present Illness:           Today's HPI:  Patient is a 59-year-old male returning to the office today for continued postoperative management regarding his left total knee arthroplasty, DOS 4/7/2025.  Patient states he is doing very well with his healing process.  He still feels like he is weaker than he should be.  He is hoping to continue physical therapy for several more weeks.  He would like to return to his job after these therapy sessions.  He denies any injury to the area or any surgical complications.    Pt returns to the office for 12 week post op LT TKA, DOS; 4/7/25.  Pt states he is doing well and still in physical therapy. Pt states he has 10 sessions left. Pt states he has no pain. Pt states he is currently working on strengthening his left knee in therapy and is requesting more time off work due to his job. Pt has no new concerns at this time.     Previous HPI:                    Olaf Vieyra is a 59 y.o. male returns today for follow-up of his left total knee replacement.  He has been doing well advancing his activity with range of motion and strengthening at the guidance of therapy.  He did have a misstep and a fall on 5/15/2025 when he landed forcefully onto the anterior aspect of his knee.  He had some swelling and bruising but has been able to maintain his ambulation activities weightbearing and therapy exercises    Patient returns to the office with a 6 week post op of his left TKA. Pt stated he has been doing therapy and working on gaining strength for his job that requires excess stairs. Pt stated that he fell on 5/15 directly onto the knee which has swelling and bruising after. Pt stated he has seen improvement since then but does have aching in his knee today       Medical History  Patient's medications, allergies, past medical, surgical, social and family

## 2025-07-10 ENCOUNTER — HOSPITAL ENCOUNTER (OUTPATIENT)
Dept: PHYSICAL THERAPY | Age: 60
Discharge: HOME | End: 2025-07-10

## 2025-07-10 NOTE — FLOWSHEET NOTE
Physical Therapy  Cancellation/No-show Note  Patient Name:  Olaf Vieyra  :  1965   Date:  7/10/2025  Cancelled visits to date: 8  No-shows to date: 1    For today's appointment patient:  [x]  Cancelled  []  Rescheduled appointment  []  No-show     Reason given by patient:  []  Patient ill  [x]  Conflicting appointment  []  No transportation    []  Conflict with work  []  No reason given  []  Other:     Comments:      Electronically signed by:  Cris Leslie PTA   7/10/2025, 9:46 AM

## 2025-07-17 NOTE — TELEPHONE ENCOUNTER
Patient called to FU up on this. I let him know that we have still not received the paperwork. The patient will be making a call to [place]. If we have not received anything by the end of office today, I will make a phone call to [facility] as well.

## 2025-07-18 ENCOUNTER — HOSPITAL ENCOUNTER (OUTPATIENT)
Dept: PHYSICAL THERAPY | Age: 60
Setting detail: THERAPIES SERIES
Discharge: HOME | End: 2025-07-18
Payer: COMMERCIAL

## 2025-07-18 PROCEDURE — 97530 THERAPEUTIC ACTIVITIES: CPT

## 2025-07-18 PROCEDURE — 97112 NEUROMUSCULAR REEDUCATION: CPT

## 2025-07-18 PROCEDURE — 97110 THERAPEUTIC EXERCISES: CPT

## 2025-07-18 NOTE — FLOWSHEET NOTE
Outpatient Physical Therapy  [place]           [] Phone: [phone]   Fax: [phone]  [place]           [x] Phone: [phone]   Fax: [phone]        Physical Therapy Daily Treatment Note  Date:  2025    Patient Name:  Olaf Vieyra  \"Skip\"  :  1965  MRN: [id number]  Restrictions/Precautions: fall risk   Diagnosis:   Status post left knee replacement [Z96.652]    Date of Injury/Surgery:  chronic.  Pt had a meniscus surgery 1.5 years ago and a L TKA 25.   Treatment Diagnosis:   M62.81 muscle weakness, R26.89 abnormality of gait   Insurance/Certification information:  Medical [place]  Referring Physician:  Emil Kaufman MD     PCP: Sreekanth Jaime MD  Plan of care signed (Y/N):  mooseal faxed  Outcome Measure: LEFs:  = 15% ability = 85% disability   Visit# / total visits:   15/25  Pain level: 2/10   Goals:     Patient goals:  Pt reports he wants to be able to go do what he needs to do and return to work.     Short term goals to be achieved by May 16, 2025:  Short term goal 1: Pt will report compliance with current HEP as prescribed in order to improve ROM and strength.   Short term goal 2: Pt will demonstrate AAROM L knee flexion to 120 degrees in order to improve ROM.   Short term goal 3: Pt will demonstrate a LEFs score of no more than 70% disability in order to improve quality of life.   Short term goal 4: Pt will demonstrate the ability to safely complete at least 2 complete laps in the 2 minute walk test with heel toe gait with a SPC in order to improve functional mobility.   Short term goal 5: Pt will demonstrate the ability to safely ambulate up the steps with a reciprocal gait and 1 HHA in 2/2 trials in order to improve functional mobility    Subjective:   Patient reports f/u appointment with Dr. Kaufman on ; not back to work yet; feels he is able to do most of his daily routine; getting out of chair and stairs seem to be his difficulty still. Reports still

## 2025-07-23 ENCOUNTER — HOSPITAL ENCOUNTER (OUTPATIENT)
Dept: PHYSICAL THERAPY | Age: 60
Setting detail: THERAPIES SERIES
Discharge: HOME | End: 2025-07-23
Payer: COMMERCIAL

## 2025-07-23 PROCEDURE — 97110 THERAPEUTIC EXERCISES: CPT

## 2025-07-23 PROCEDURE — 97530 THERAPEUTIC ACTIVITIES: CPT

## 2025-07-23 NOTE — FLOWSHEET NOTE
Outpatient Physical Therapy  [place]           [] Phone: [phone]   Fax: [phone]  [place]           [x] Phone: [phone]   Fax: [phone]        Physical Therapy Daily Treatment Note  Date:  2025    Patient Name:  Olaf Vieyra  \"Skip\"  :  1965  MRN: [id number]  Restrictions/Precautions: fall risk   Diagnosis:   Status post left knee replacement [Z96.652]    Date of Injury/Surgery:  chronic.  Pt had a meniscus surgery 1.5 years ago and a L TKA 25.   Treatment Diagnosis:   M62.81 muscle weakness, R26.89 abnormality of gait   Insurance/Certification information:  [employer] [place]  Referring Physician:  Emil Kaufman MD     PCP: Sreekanth Jaime MD  Plan of care signed (Y/N):  travon faxed  Outcome Measure: LEFs:  = 15% ability = 85% disability   Visit# / total visits:     Pain level: 2/10   Goals:     Patient goals:  Pt reports he wants to be able to go do what he needs to do and return to work.     Short term goals to be achieved by May 16, 2025:  Short term goal 1: Pt will report compliance with current HEP as prescribed in order to improve ROM and strength.   Short term goal 2: Pt will demonstrate AAROM L knee flexion to 120 degrees in order to improve ROM.   Short term goal 3: Pt will demonstrate a LEFs score of no more than 70% disability in order to improve quality of life.   Short term goal 4: Pt will demonstrate the ability to safely complete at least 2 complete laps in the 2 minute walk test with heel toe gait with a SPC in order to improve functional mobility.   Short term goal 5: Pt will demonstrate the ability to safely ambulate up the steps with a reciprocal gait and 1 HHA in 2/2 trials in order to improve functional mobility    Subjective:  Not having any knee pain today.  Notes continued muscle soreness and weakness.  The weakness is with stepping back or to the side.  Is concerned that he has to make quick movements at work.  Has a follow up with the

## 2025-07-25 ENCOUNTER — HOSPITAL ENCOUNTER (OUTPATIENT)
Dept: PHYSICAL THERAPY | Age: 60
Setting detail: THERAPIES SERIES
Discharge: HOME | End: 2025-07-25
Payer: COMMERCIAL

## 2025-07-25 PROCEDURE — 97140 MANUAL THERAPY 1/> REGIONS: CPT

## 2025-07-25 PROCEDURE — 97110 THERAPEUTIC EXERCISES: CPT

## 2025-07-25 NOTE — FLOWSHEET NOTE
Outpatient Physical Therapy  [place]           [] Phone: [phone]   Fax: [phone]  [place]           [x] Phone: [phone]   Fax: [phone]        Physical Therapy Daily Treatment Note  Date:  2025    Patient Name:  Olaf Vieyra  \"Skip\"  :  1965  MRN: [id number]  Restrictions/Precautions: fall risk   Diagnosis:   Status post left knee replacement [Z96.652]    Date of Injury/Surgery:  chronic.  Pt had a meniscus surgery 1.5 years ago and a L TKA 25.   Treatment Diagnosis:   M62.81 muscle weakness, R26.89 abnormality of gait   Insurance/Certification information:  [employer] [place]  Referring Physician:  Emil Kaufman MD     PCP: Sreekanth Jaime MD  Plan of care signed (Y/N):  travon faxed  Outcome Measure: LEFs:  = 15% ability = 85% disability   Visit# / total visits:     Pain level: 1/10   Goals:     Patient goals:  Pt reports he wants to be able to go do what he needs to do and return to work.     Short term goals to be achieved by May 16, 2025:  Short term goal 1: Pt will report compliance with current HEP as prescribed in order to improve ROM and strength.   Short term goal 2: Pt will demonstrate AAROM L knee flexion to 120 degrees in order to improve ROM.   Short term goal 3: Pt will demonstrate a LEFs score of no more than 70% disability in order to improve quality of life.   Short term goal 4: Pt will demonstrate the ability to safely complete at least 2 complete laps in the 2 minute walk test with heel toe gait with a SPC in order to improve functional mobility. MET  Short term goal 5: Pt will demonstrate the ability to safely ambulate up the steps with a reciprocal gait and 1 HHA in 2/2 trials in order to improve functional mobility PROGRESSING     Subjective:  Not having any knee pain today.  Notes continued muscle soreness and weakness.  Has a follow up with the surgeon on . Plans to discuss being off work longer due to concerns about being able to make

## 2025-07-25 NOTE — PROGRESS NOTES
Outpatient Physical Therapy           [place]           [] Phone: [phone]   Fax: [phone]  [place]           [x] Phone: [phone]   Fax: [phone]      To: Emil Kaufman MD     From: Tony Burgess, PT, DPT     Patient: Olaf Vieyra                    : 1965  Diagnosis:  Status post left knee replacement [Z96.652]        Treatment Diagnosis:    Pain, decrease ROM, decrease Strength    Date: 2025  [x]  Progress Note                []  Discharge Note    Evaluation Date:   2025  Total Visits to date:   17 Cancels/No-shows to date:  0    Subjective:    Notes continued muscle soreness and weakness.  Has a follow up with the surgeon on . Plans to discuss being off work longer due to concerns about being able to make quick movements if needed. Feels he his getting close but strength still lacking for safety at home.     Plan of Care/Treatment to date:  [x] Therapeutic Exercise    [x] Modalities:  [x] Therapeutic Activity     [] Ultrasound  [] Electrical Stimulation  [x] Gait Training      [] Cervical Traction   [] Lumbar Traction  [] Neuromuscular Re-education  [x] Cold/hotpack [] Iontophoresis  [x] Instruction in HEP      Other:  [x] Manual Therapy       []  Vasopneumatic  [] Aquatic Therapy       []   Dry Needle Therapy                      Objective/Significant Findings At Last Visit/Comments:    One instance of patient's left knee giving out on him when walking up to the steps  Continues to demonstrate antalgic gait pattern  Left ROM Flexion 113  Left knee extension=0  Joint line circumference = 46.5cm    Jioint Line Circunference= 43.5cm  Right knee roctoku=037  MMT Left knee flexors 4/5  MMT Left knee extensors 4/5  LEFTS= 44/80      Assessment:     Patient noted that his legs were tired at the end of treatment.  Progressed strengthening and balance activities this visit which were tolerated without difficulty, just fatigue. Does demonstrate slight antalgic gait with

## 2025-07-29 ENCOUNTER — OFFICE VISIT (OUTPATIENT)
Dept: ORTHOPEDIC SURGERY | Age: 60
End: 2025-07-29
Payer: COMMERCIAL

## 2025-07-29 VITALS — BODY MASS INDEX: 36.4 KG/M2 | RESPIRATION RATE: 17 BRPM | HEART RATE: 58 BPM | OXYGEN SATURATION: 98 % | HEIGHT: 71 IN

## 2025-07-29 DIAGNOSIS — Z96.652 STATUS POST TOTAL LEFT KNEE REPLACEMENT: Primary | ICD-10-CM

## 2025-07-29 PROCEDURE — G8417 CALC BMI ABV UP PARAM F/U: HCPCS | Performed by: ORTHOPAEDIC SURGERY

## 2025-07-29 PROCEDURE — 1036F TOBACCO NON-USER: CPT | Performed by: ORTHOPAEDIC SURGERY

## 2025-07-29 PROCEDURE — 99212 OFFICE O/P EST SF 10 MIN: CPT | Performed by: ORTHOPAEDIC SURGERY

## 2025-07-29 PROCEDURE — 3017F COLORECTAL CA SCREEN DOC REV: CPT | Performed by: ORTHOPAEDIC SURGERY

## 2025-07-29 PROCEDURE — G8427 DOCREV CUR MEDS BY ELIG CLIN: HCPCS | Performed by: ORTHOPAEDIC SURGERY

## 2025-07-30 ENCOUNTER — HOSPITAL ENCOUNTER (OUTPATIENT)
Dept: PHYSICAL THERAPY | Age: 60
Discharge: HOME | End: 2025-07-30

## 2025-07-30 NOTE — FLOWSHEET NOTE
Physical Therapy  Cancellation/No-show Note  Patient Name:  Olaf Vieyra  :  1965   Date:  2025  Cancelled visits to date: 9  No-shows to date: 1    For today's appointment patient:  [x]  Cancelled  []  Rescheduled appointment  []  No-show     Reason given by patient:  []  Patient ill  []  Conflicting appointment  []  No transportation    []  Conflict with work  [x]  No reason given  []  Other:     Comments:      Electronically signed by:  Cris Leslie PTA   2025, 9:00 AM

## 2025-08-01 ENCOUNTER — HOSPITAL ENCOUNTER (OUTPATIENT)
Dept: PHYSICAL THERAPY | Age: 60
Discharge: HOME | End: 2025-08-01

## 2025-08-13 ENCOUNTER — HOSPITAL ENCOUNTER (OUTPATIENT)
Dept: PHYSICAL THERAPY | Age: 60
Discharge: HOME | End: 2025-08-13

## (undated) DEVICE — TUBING, SUCTION, 9/32" X 10', STRAIGHT: Brand: MEDLINE

## (undated) DEVICE — 3M™ STERI-DRAPE™ INSTRUMENT POUCH 1018: Brand: STERI-DRAPE™

## (undated) DEVICE — SYSTEM SKIN CLSR 22CM DERMBND PRINEO

## (undated) DEVICE — PIN BNE FIX L110MM DIA32MM

## (undated) DEVICE — KIT INT FIX FEM TIB CKPT MAKOPLASTY

## (undated) DEVICE — SOLUTION IV 100ML 0.9% SOD CHL PLAS CONT USP VIAFLX 1 PER

## (undated) DEVICE — SHEET,DRAPE,53X77,STERILE: Brand: MEDLINE

## (undated) DEVICE — DRAPE SHEET ULTRAGARD: Brand: MEDLINE

## (undated) DEVICE — STERILE LATEX POWDER-FREE SURGICAL GLOVESWITH NITRILE COATING: Brand: PROTEXIS

## (undated) DEVICE — KIT DRP FOR RIO ROBOTIC ARM ASST SYS

## (undated) DEVICE — TOWEL,OR,DSP,ST,BLUE,STD,6/PK,12PK/CS: Brand: MEDLINE

## (undated) DEVICE — PIN BNE FIX TEMP L140MM DIA4MM MAKO

## (undated) DEVICE — SYRINGE MEDICAL 3ML CLEAR PLASTIC STANDARD NON CONTROL LUERLOCK TIP DISPOSABLE

## (undated) DEVICE — CLASSIC CLD THER SYS

## (undated) DEVICE — PADDING CAST W6INXL4YD COT COHESIVE HND TEARABLE SPEC 100

## (undated) DEVICE — TUBING PMP L16FT MAIN DISP FOR AR-6400 AR-6475

## (undated) DEVICE — MAT ABSRB W28XL48IN C6L FLR ULT W POLY BK

## (undated) DEVICE — STOCKING,ANTI-EMBOLISM,T-L,XL REG,LF: Brand: MEDLINE

## (undated) DEVICE — WEREWOLF FLOW 50 COBLATION WAND: Brand: COBLATION

## (undated) DEVICE — COUNTER NDL 30 COUNT FOAM STRP SGL MAG

## (undated) DEVICE — HOOD WITH PEEL AWAY FACE SHIELD: Brand: T7PLUS

## (undated) DEVICE — SUTURE ETHLN SZ 3-0 L18IN NONABSORBABLE BLK FS-1 L24MM 3/8 663H

## (undated) DEVICE — ELECTRODE ES AD CRDLSS PT RET REM POLYHESIVE

## (undated) DEVICE — ZIMMER® STERILE DISPOSABLE TOURNIQUET CUFF WITH PLC, DUAL PORT, SINGLE BLADDER, 34 IN. (86 CM)

## (undated) DEVICE — PAD,ABDOMINAL,5"X9",ST,LF,25/BX: Brand: MEDLINE INDUSTRIES, INC.

## (undated) DEVICE — Device

## (undated) DEVICE — DUAL CUT SAGITTAL BLADE

## (undated) DEVICE — BLUNTFILL: Brand: MONOJECT

## (undated) DEVICE — CORD RETRCT SIL - ORDER MULTIPLES OF 10 EACH

## (undated) DEVICE — SOLUTION IV IRRIG WATER 1000ML POUR BRL 2F7114

## (undated) DEVICE — BANDAGE,ELASTIC,ESMARK,STERILE,6"X9',LF: Brand: MEDLINE

## (undated) DEVICE — BANDAGE COMPR M W6INXL10YD WHT BGE VELC E MTRX HK AND LOOP

## (undated) DEVICE — NEEDLE HYPO 20GA L1.5IN YEL POLYPR HUB S STL REG BVL STR

## (undated) DEVICE — GLOVE SURG SZ 8 L12IN THK75MIL DK GRN LTX FREE

## (undated) DEVICE — GLOVE ORANGE PI 7   MSG9070

## (undated) DEVICE — APPLICATOR MEDICATED 26 CC SOLUTION HI LT ORNG CHLORAPREP

## (undated) DEVICE — SHEET PT TRANSFER 80X40 IN LAT AIR NYL GRY COMFORT GLIDE

## (undated) DEVICE — Device: Brand: POWER-FLO®

## (undated) DEVICE — HOOD: Brand: T7PLUS

## (undated) DEVICE — COVER,TABLE,44X90,STERILE: Brand: MEDLINE

## (undated) DEVICE — DRESSING PETRO W3XL3IN OIL EMUL N ADH GZ KNIT IMPREG CELOS

## (undated) DEVICE — KNEE POSITIONING KIT: Brand: DEVON

## (undated) DEVICE — SUTURE VICRYL SZ 2-0 L18IN ABSRB UD CT-1 L36MM 1/2 CIR J839D

## (undated) DEVICE — 3M™ STERI-DRAPE™ U-DRAPE 1015: Brand: STERI-DRAPE™

## (undated) DEVICE — STOCKING,ANTI-EMBOLISM,T-L,L REG,LF: Brand: MEDLINE

## (undated) DEVICE — GLOVE SURG SZ 65 CRM LTX FREE POLYISOPRENE POLYMER BEAD ANTI

## (undated) DEVICE — SYRINGE MED 30ML STD CLR PLAS LUERLOCK TIP N CTRL DISP

## (undated) DEVICE — ZIMMER® STERILE DISPOSABLE TOURNIQUET CUFF WITH PLC, DUAL PORT, SINGLE BLADDER, 30 IN. (76 CM)

## (undated) DEVICE — KIT TRK KNEE PROC VIZADISC

## (undated) DEVICE — PAD THER KNEE 11.25X9.75 IN MULT USE CLD NS

## (undated) DEVICE — APPLICATOR MEDICATED 26 CC TINT HI-LITE ORNG STRL CHLORAPREP

## (undated) DEVICE — MARKER SURG SKIN UTIL REGULAR/FINE 2 TIP W/ RUL AND 9 LBL

## (undated) DEVICE — Z INACTIVE USE 2660664 SOLUTION IRRIG 3000ML 0.9% SOD CHL USP UROMATIC PLAS CONT

## (undated) DEVICE — 4-PORT MANIFOLD: Brand: NEPTUNE 2

## (undated) DEVICE — GLOVE ORANGE PI 7 1/2   MSG9075

## (undated) DEVICE — [AGGRESSIVE PLUS CUTTER, ARTHROSCOPIC SHAVER BLADE,  DO NOT RESTERILIZE,  DO NOT USE IF PACKAGE IS DAMAGED,  KEEP DRY,  KEEP AWAY FROM SUNLIGHT]: Brand: FORMULA

## (undated) DEVICE — PAD CLD R HIP REG HOSE NONSTERILE

## (undated) DEVICE — GLOVE ORANGE PI 8   MSG9080

## (undated) DEVICE — SOLUTION SURG PREP PVP IOD 10% 8 OZ BTL SCRB CARE

## (undated) DEVICE — SYSTEM CLD THER CRYO W PD MULT USE TB PWR OPERATED W OUT BD

## (undated) DEVICE — PADDING CAST W6INXL4YD COT LO LINTING WYTEX

## (undated) DEVICE — SUTURE ABSORBABLE MONOFILAMENT 1 CTX 36 CM 48 MM VIO PDS +

## (undated) DEVICE — BOOT POS LEG DEMAYO

## (undated) DEVICE — ARTHROSCOPY PACK: Brand: MEDLINE INDUSTRIES, INC.

## (undated) DEVICE — SUTURE MONOCRYL ABSORBABLE 3-0 PS-1 18 IN UD MONOCRYL + STRATAFIX SXMP1B102